# Patient Record
Sex: FEMALE | Race: WHITE | NOT HISPANIC OR LATINO | Employment: UNEMPLOYED | URBAN - METROPOLITAN AREA
[De-identification: names, ages, dates, MRNs, and addresses within clinical notes are randomized per-mention and may not be internally consistent; named-entity substitution may affect disease eponyms.]

---
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
## 2023-01-01 ENCOUNTER — HOSPITAL ENCOUNTER (EMERGENCY)
Facility: HOSPITAL | Age: 18
Discharge: PSYCHIATRIC OR SUBSTANCE USE FACILITY | End: 2023-01-01
Attending: EMERGENCY MEDICINE
Payer: MEDICAID

## 2023-01-01 VITALS
TEMPERATURE: 98 F | HEIGHT: 61 IN | OXYGEN SATURATION: 96 % | SYSTOLIC BLOOD PRESSURE: 106 MMHG | HEART RATE: 98 BPM | RESPIRATION RATE: 18 BRPM | WEIGHT: 98 LBS | BODY MASS INDEX: 18.5 KG/M2 | DIASTOLIC BLOOD PRESSURE: 67 MMHG

## 2023-01-01 DIAGNOSIS — R45.851 DEPRESSION WITH SUICIDAL IDEATION: Primary | ICD-10-CM

## 2023-01-01 DIAGNOSIS — F32.A DEPRESSION WITH SUICIDAL IDEATION: Primary | ICD-10-CM

## 2023-01-01 LAB
ALBUMIN SERPL-MCNC: 4.2 G/DL (ref 3.5–5)
ALBUMIN/GLOB SERPL: 1.7 RATIO (ref 1.1–2)
ALP SERPL-CCNC: 65 UNIT/L (ref 40–150)
ALT SERPL-CCNC: 9 UNIT/L (ref 0–55)
AMPHET UR QL SCN: NEGATIVE
APAP SERPL-MCNC: <17.4 UG/ML (ref 17.4–30)
APPEARANCE UR: CLEAR
AST SERPL-CCNC: 15 UNIT/L (ref 5–34)
B-HCG SERPL QL: NEGATIVE
BACTERIA #/AREA URNS AUTO: NORMAL /HPF
BARBITURATE SCN PRESENT UR: NEGATIVE
BASOPHILS # BLD AUTO: 0.05 X10(3)/MCL (ref 0–0.2)
BASOPHILS NFR BLD AUTO: 1 %
BENZODIAZ UR QL SCN: NEGATIVE
BILIRUB UR QL STRIP.AUTO: NEGATIVE MG/DL
BILIRUBIN DIRECT+TOT PNL SERPL-MCNC: 0.3 MG/DL
BUN SERPL-MCNC: 6.9 MG/DL (ref 7–18.7)
CALCIUM SERPL-MCNC: 9.1 MG/DL (ref 8.4–10.2)
CANNABINOIDS UR QL SCN: NEGATIVE
CHLORIDE SERPL-SCNC: 112 MMOL/L (ref 98–107)
CO2 SERPL-SCNC: 24 MMOL/L (ref 22–29)
COCAINE UR QL SCN: NEGATIVE
COLOR UR AUTO: YELLOW
CREAT SERPL-MCNC: 0.7 MG/DL (ref 0.55–1.02)
EOSINOPHIL # BLD AUTO: 0.02 X10(3)/MCL (ref 0–0.9)
EOSINOPHIL NFR BLD AUTO: 0.4 %
ERYTHROCYTE [DISTWIDTH] IN BLOOD BY AUTOMATED COUNT: 12.4 % (ref 11–14.5)
ETHANOL SERPL-MCNC: 123 MG/DL
FENTANYL UR QL SCN: NEGATIVE
GFR SERPLBLD CREATININE-BSD FMLA CKD-EPI: >60 MLS/MIN/1.73/M2
GLOBULIN SER-MCNC: 2.5 GM/DL (ref 2.4–3.5)
GLUCOSE SERPL-MCNC: 88 MG/DL (ref 74–100)
GLUCOSE UR QL STRIP.AUTO: NEGATIVE MG/DL
HCT VFR BLD AUTO: 37.2 % (ref 37–47)
HGB BLD-MCNC: 12.3 GM/DL (ref 12–16)
IMM GRANULOCYTES # BLD AUTO: 0.02 X10(3)/MCL (ref 0–0.04)
IMM GRANULOCYTES NFR BLD AUTO: 0.4 %
KETONES UR QL STRIP.AUTO: NEGATIVE MG/DL
LEUKOCYTE ESTERASE UR QL STRIP.AUTO: NEGATIVE UNIT/L
LYMPHOCYTES # BLD AUTO: 1.89 X10(3)/MCL (ref 0.6–4.6)
LYMPHOCYTES NFR BLD AUTO: 37.3 %
MCH RBC QN AUTO: 29.2 PG
MCHC RBC AUTO-ENTMCNC: 33.1 MG/DL (ref 33–36)
MCV RBC AUTO: 88.4 FL (ref 80–94)
MDMA UR QL SCN: NEGATIVE
MONOCYTES # BLD AUTO: 0.41 X10(3)/MCL (ref 0.1–1.3)
MONOCYTES NFR BLD AUTO: 8.1 %
NEUTROPHILS # BLD AUTO: 2.68 X10(3)/MCL (ref 2.1–9.2)
NEUTROPHILS NFR BLD AUTO: 52.8 %
NITRITE UR QL STRIP.AUTO: NEGATIVE
NRBC BLD AUTO-RTO: 0 % (ref 0–1)
OPIATES UR QL SCN: NEGATIVE
PCP UR QL: NEGATIVE
PH UR STRIP.AUTO: 6 [PH]
PH UR: 6 [PH] (ref 3–11)
PLATELET # BLD AUTO: 211 X10(3)/MCL (ref 140–371)
PMV BLD AUTO: 11.2 FL (ref 9.4–12.4)
POTASSIUM SERPL-SCNC: 4.5 MMOL/L (ref 3.5–5.1)
PROT SERPL-MCNC: 6.7 GM/DL (ref 6.4–8.3)
PROT UR QL STRIP.AUTO: NEGATIVE MG/DL
RBC # BLD AUTO: 4.21 X10(6)/MCL (ref 4.2–5.4)
RBC #/AREA URNS AUTO: <5 /HPF
RBC UR QL AUTO: NEGATIVE UNIT/L
SODIUM SERPL-SCNC: 145 MMOL/L (ref 136–145)
SP GR UR STRIP.AUTO: 1.01 (ref 1–1.03)
SQUAMOUS #/AREA URNS AUTO: <5 /HPF
TSH SERPL-ACNC: 1.26 UIU/ML (ref 0.35–4.94)
UROBILINOGEN UR STRIP-ACNC: 0.2 MG/DL
WBC # SPEC AUTO: 5.1 X10(3)/MCL (ref 4.5–11.5)
WBC #/AREA URNS AUTO: <5 /HPF

## 2023-01-01 PROCEDURE — 99285 EMERGENCY DEPT VISIT HI MDM: CPT

## 2023-01-01 PROCEDURE — 84443 ASSAY THYROID STIM HORMONE: CPT | Performed by: EMERGENCY MEDICINE

## 2023-01-01 PROCEDURE — 80053 COMPREHEN METABOLIC PANEL: CPT | Performed by: EMERGENCY MEDICINE

## 2023-01-01 PROCEDURE — 81001 URINALYSIS AUTO W/SCOPE: CPT | Mod: 59 | Performed by: EMERGENCY MEDICINE

## 2023-01-01 PROCEDURE — 82077 ASSAY SPEC XCP UR&BREATH IA: CPT | Performed by: EMERGENCY MEDICINE

## 2023-01-01 PROCEDURE — 80143 DRUG ASSAY ACETAMINOPHEN: CPT | Performed by: EMERGENCY MEDICINE

## 2023-01-01 PROCEDURE — 80307 DRUG TEST PRSMV CHEM ANLYZR: CPT | Performed by: EMERGENCY MEDICINE

## 2023-01-01 PROCEDURE — 85025 COMPLETE CBC W/AUTO DIFF WBC: CPT | Performed by: EMERGENCY MEDICINE

## 2023-01-01 PROCEDURE — 81025 URINE PREGNANCY TEST: CPT | Performed by: EMERGENCY MEDICINE

## 2023-01-01 NOTE — ED PROVIDER NOTES
"Encounter Date: 1/1/2023       History     Chief Complaint   Patient presents with    Suicidal     Presents with SI.      17-year-old female presenting with suicidal ideation today.  Patient came here alone after a night of drinking in arguing with her boyfriend.  She states that she thinks she was just mad that was trying to get attention.   Patient says she does not deserve a hospital bed here because there are "actual people dying" Denies any plan of suicide.  Has had suicide attempts in the past.  States she is taking her Latuda as prescribed    The history is provided by the patient.   Mental Health Problem  The primary symptoms include depressed mood and suicidal ideas. The primary symptoms do not include self-injury. The current episode started today. This is a recurrent problem.   The onset of the illness is precipitated by alcohol abuse and emotional stress. The degree of incapacity that she is experiencing as a consequence of her illness is moderate. Additional symptoms of the illness include poor judgment. Additional symptoms of the illness do not include abdominal pain. She admits to suicidal ideas. She has not already injured self. She does not contemplate injuring another person. She has not already  injured another person. Risk factors that are present for mental illness include a history of mental illness.   Review of patient's allergies indicates:  Not on File  Past Medical History:   Diagnosis Date    Anxiety disorder, unspecified     Depression     Scoliosis      No past surgical history on file.  No family history on file.  Social History     Substance Use Topics    Alcohol use: Yes     Review of Systems   Constitutional:  Negative for fever.   HENT:  Negative for congestion.    Gastrointestinal:  Negative for abdominal pain, nausea and vomiting.   Psychiatric/Behavioral:  Positive for suicidal ideas. Negative for self-injury.      Physical Exam     Initial Vitals [01/01/23 0532]   BP Pulse Resp " Temp SpO2   115/62 98 16 98.2 °F (36.8 °C) 98 %      MAP       --         Physical Exam    Nursing note and vitals reviewed.  Constitutional: She appears well-developed and well-nourished. She is not diaphoretic. She does not appear ill. No distress.   HENT:   Head: Normocephalic and atraumatic.   Right Ear: External ear normal.   Left Ear: External ear normal.   Mouth/Throat: Oropharynx is clear and moist.   Eyes: Conjunctivae and EOM are normal. Pupils are equal, round, and reactive to light.   Neck: Neck supple. No tracheal deviation present.   Cardiovascular:  Normal rate, regular rhythm, normal heart sounds and intact distal pulses.           No murmur heard.  Pulmonary/Chest: Breath sounds normal. No respiratory distress.   Abdominal: She exhibits no distension.   No right CVA tenderness.  No left CVA tenderness.   Musculoskeletal:         General: Normal range of motion.      Cervical back: Neck supple.     Neurological: She is alert and oriented to person, place, and time. She has normal strength. GCS score is 15. GCS eye subscore is 4. GCS verbal subscore is 5. GCS motor subscore is 6.   Skin: Skin is warm and dry. Capillary refill takes less than 2 seconds. No pallor.   Psychiatric: Her speech is normal. She is withdrawn. She expresses impulsivity and inappropriate judgment. She exhibits a depressed mood. She expresses no suicidal plans.       ED Course   Procedures  Labs Reviewed   COMPREHENSIVE METABOLIC PANEL - Abnormal; Notable for the following components:       Result Value    Chloride 112 (*)     Blood Urea Nitrogen 6.9 (*)     All other components within normal limits   ALCOHOL,MEDICAL (ETHANOL) - Abnormal; Notable for the following components:    Ethanol Level 123.0 (*)     All other components within normal limits   ACETAMINOPHEN LEVEL - Abnormal; Notable for the following components:    Acetaminophen Level <17.4 (*)     All other components within normal limits   TSH - Normal   URINALYSIS,  REFLEX TO URINE CULTURE - Normal   DRUG SCREEN, URINE (BEAKER) - Normal    Narrative:     Cut off concentrations:    Amphetamines - 1000 ng/ml  Barbiturates - 200 ng/ml  Benzodiazepine - 200 ng/ml  Cannabinoids (THC) - 50 ng/ml  Cocaine - 300 ng/ml  Fentanyl - 1.0 ng/ml  MDMA - 500 ng/ml  Opiates - 300 ng/ml   Phencyclidine (PCP) - 25 ng/ml    Specimen submitted for drug analysis and tested for pH and specific gravity in order to evaluate sample integrity. Suspect tampering if specific gravity is <1.003 and/or pH is not within the range of 4.5 - 8.0  False negatives may result form substances such as bleach added to urine.  False positives may result for the presence of a substance with similar chemical structure to the drug or its metabolite.    This test provides only a PRELIMINARY analytical test result. A more specific alternate chemical method must be used in order to obtain a confirmed analytical result. Gas chromatography/mass spectrometry (GC/MS) is the preferred confirmatory method. Other chemical confirmation methods are available. Clinical consideration and professional judgement should be applied to any drug of abuse test result, particularly when preliminary positive results are used.    Positive results will be confirmed only at the physicians request. Unconfirmed screening results are to be used only for medical purposes (treatment).        PREGNANCY TEST, URINE RAPID - Normal   URINALYSIS, MICROSCOPIC - Normal   CBC W/ AUTO DIFFERENTIAL    Narrative:     The following orders were created for panel order CBC auto differential.  Procedure                               Abnormality         Status                     ---------                               -----------         ------                     CBC with Differential[[id number]]                            Final result                 Please view results for these tests on the individual orders.   CBC WITH DIFFERENTIAL   SARS CORONAVIRUS 2 ANTIGEN  "POCT, MANUAL READ          Imaging Results    None          Medications - No data to display  Medical Decision Making:   Initial Assessment:   Depressed, withdrawn, admits to suicidal thoughts but no plan. Patient expresses suicidal thoughts though states she drank too much and was mad at her boyfriend. She says she thinks she was just trying to get attention and she does not deserve a hospital bed here because there are "actual people dying"   Differential Diagnosis:   suicidal ideations, homicidal ideations, auditory or visual hallucinations, drug/etoh intoxication, bipolar disorder, schizophrenia, schizoaffective, delusional disorder, major depressive disorder, PTSD, substance induced mood disorder, violent behavior, suicidal attempt, non-psychiatric medical illness, malingering      Clinical Tests:   Lab Tests: Ordered and Reviewed  ED Management:  Patient is currently a danger to herself, placed under PEC 0559  Basic psych labs obtained and normal  Medically clear for psych facility            ED Course as of 01/01/23 1021   Sun Jan 01, 2023   1019 Alcohol, Serum(!): 123.0  Reviewed patient lab work-her alcohol level is slightly elevated at 123 with a remainder of her labs unremarkable.  She is medically clear for transfer to a psychiatric facility [KM]      ED Course User Index  [KM] Yari Kruse MD       Medically cleared for psychiatry placement: 1/1/2023 10:20 AM         Clinical Impression:   Final diagnoses:  [F32.A, R45.851] Depression with suicidal ideation (Primary)        ED Disposition Condition    Transfer to Psych Facility Stable          ED Prescriptions    None       Follow-up Information    None          Yari Kruse MD  01/01/23 1021    "

## 2023-01-03 NOTE — ED NOTES
PT MEDICATIONS FOUND IN [facility]. Counted and placed in security sealed bag #[id number] and given to Security

## 2023-06-20 DIAGNOSIS — O30.049 TWIN PREGNANCY, TWINS DICHORIONIC AND DIAMNIOTIC: Primary | ICD-10-CM

## 2023-08-15 ENCOUNTER — OFFICE VISIT (OUTPATIENT)
Dept: MATERNAL FETAL MEDICINE | Facility: CLINIC | Age: 18
End: 2023-08-15
Payer: MEDICAID

## 2023-08-15 ENCOUNTER — PROCEDURE VISIT (OUTPATIENT)
Dept: MATERNAL FETAL MEDICINE | Facility: CLINIC | Age: 18
End: 2023-08-15
Payer: MEDICAID

## 2023-08-15 VITALS
WEIGHT: 111 LBS | DIASTOLIC BLOOD PRESSURE: 63 MMHG | BODY MASS INDEX: 20.96 KG/M2 | SYSTOLIC BLOOD PRESSURE: 99 MMHG | HEIGHT: 61 IN | HEART RATE: 75 BPM

## 2023-08-15 DIAGNOSIS — Q78.0 OSTEOGENESIS IMPERFECTA TYPE I: ICD-10-CM

## 2023-08-15 DIAGNOSIS — O30.042 DICHORIONIC DIAMNIOTIC TWIN PREGNANCY IN SECOND TRIMESTER: ICD-10-CM

## 2023-08-15 DIAGNOSIS — F31.9 BIPOLAR DISEASE IN PREGNANCY, SECOND TRIMESTER: ICD-10-CM

## 2023-08-15 DIAGNOSIS — O30.049 TWIN PREGNANCY, TWINS DICHORIONIC AND DIAMNIOTIC: ICD-10-CM

## 2023-08-15 DIAGNOSIS — O09.90 AT HIGH RISK FOR COMPLICATIONS OF INTRAUTERINE PREGNANCY (IUP): ICD-10-CM

## 2023-08-15 DIAGNOSIS — O99.342 BIPOLAR DISEASE IN PREGNANCY, SECOND TRIMESTER: ICD-10-CM

## 2023-08-15 PROCEDURE — 3008F BODY MASS INDEX DOCD: CPT | Mod: CPTII,S$GLB,, | Performed by: OBSTETRICS & GYNECOLOGY

## 2023-08-15 PROCEDURE — 99204 OFFICE O/P NEW MOD 45 MIN: CPT | Mod: TH,25,S$GLB, | Performed by: OBSTETRICS & GYNECOLOGY

## 2023-08-15 PROCEDURE — 76811 PR US, OB FETAL EVAL & EXAM, TRANSABDOM,FIRST GESTATION: ICD-10-PCS | Mod: S$GLB,,, | Performed by: OBSTETRICS & GYNECOLOGY

## 2023-08-15 PROCEDURE — 99204 PR OFFICE/OUTPT VISIT, NEW, LEVL IV, 45-59 MIN: ICD-10-PCS | Mod: TH,25,S$GLB, | Performed by: OBSTETRICS & GYNECOLOGY

## 2023-08-15 PROCEDURE — 76811 OB US DETAILED SNGL FETUS: CPT | Mod: S$GLB,,, | Performed by: OBSTETRICS & GYNECOLOGY

## 2023-08-15 PROCEDURE — 76812 OB US DETAILED ADDL FETUS: ICD-10-PCS | Mod: S$GLB,,, | Performed by: OBSTETRICS & GYNECOLOGY

## 2023-08-15 PROCEDURE — 76812 OB US DETAILED ADDL FETUS: CPT | Mod: S$GLB,,, | Performed by: OBSTETRICS & GYNECOLOGY

## 2023-08-15 PROCEDURE — 3074F SYST BP LT 130 MM HG: CPT | Mod: CPTII,S$GLB,, | Performed by: OBSTETRICS & GYNECOLOGY

## 2023-08-15 PROCEDURE — 76817 TRANSVAGINAL US OBSTETRIC: CPT | Mod: S$GLB,,, | Performed by: OBSTETRICS & GYNECOLOGY

## 2023-08-15 PROCEDURE — 3074F PR MOST RECENT SYSTOLIC BLOOD PRESSURE < 130 MM HG: ICD-10-PCS | Mod: CPTII,S$GLB,, | Performed by: OBSTETRICS & GYNECOLOGY

## 2023-08-15 PROCEDURE — 3078F DIAST BP <80 MM HG: CPT | Mod: CPTII,S$GLB,, | Performed by: OBSTETRICS & GYNECOLOGY

## 2023-08-15 PROCEDURE — 3008F PR BODY MASS INDEX (BMI) DOCUMENTED: ICD-10-PCS | Mod: CPTII,S$GLB,, | Performed by: OBSTETRICS & GYNECOLOGY

## 2023-08-15 PROCEDURE — 1159F MED LIST DOCD IN RCRD: CPT | Mod: CPTII,S$GLB,, | Performed by: OBSTETRICS & GYNECOLOGY

## 2023-08-15 PROCEDURE — 76817 PR US, OB, TRANSVAG APPROACH: ICD-10-PCS | Mod: S$GLB,,, | Performed by: OBSTETRICS & GYNECOLOGY

## 2023-08-15 PROCEDURE — 3078F PR MOST RECENT DIASTOLIC BLOOD PRESSURE < 80 MM HG: ICD-10-PCS | Mod: CPTII,S$GLB,, | Performed by: OBSTETRICS & GYNECOLOGY

## 2023-08-15 PROCEDURE — 1159F PR MEDICATION LIST DOCUMENTED IN MEDICAL RECORD: ICD-10-PCS | Mod: CPTII,S$GLB,, | Performed by: OBSTETRICS & GYNECOLOGY

## 2023-08-15 RX ORDER — LURASIDONE HYDROCHLORIDE 20 MG/1
20 TABLET, FILM COATED ORAL
COMMUNITY
Start: 2023-04-19 | End: 2023-11-07 | Stop reason: SDUPTHER

## 2023-08-15 RX ORDER — ASPIRIN 81 MG/1
81 TABLET ORAL DAILY
Refills: 0 | COMMUNITY
Start: 2023-08-15 | End: 2023-09-26 | Stop reason: SDUPTHER

## 2023-08-15 NOTE — ASSESSMENT & PLAN NOTE
Counseled on twin gestation pregnancies. She was advised of the rate of twins at 3.2% appears to have stabilized after increasing over the past few decades. 75% of twins are dizygotic and 25% monozygotic. Among the 25% monozygotic twins, 25% are dichorionic and 75% monochorionic (when embryo splits within 3 days of fertilization).    The higher risk of anomalies with twin gestation and 8% risks in spontaneous pregnancy loss was discussed. The reassurance obtained by the normal ultrasound, the limitations of ultrasound in checking anomalies and need for  evaluation was addressed. There is also higher risk of aneuploidy with dizygotic twins, which is twice the risk based on background maternal age or egg-donor age.    Additionally, the increased risk of  labor and delivery was discussed.  There is no prevention recommended with no history of previous  delivery.      There is data to show that vaginal progesterone, is helpful for decreasing risk of  delivery with olson, but data is not conclusive for twins and is still considered investigational at this time, . [place] cervical surveillance is not known - will plan to check cervix around 16 weeks. If shortening noted at 16 week scan will need closer surveillance and may consider vaginal progesterone, though investigational as above. She was advised to come in at anytime if there is increased cramping, vaginal discharge, or bleeding.     Additionally, prophylactic cerclage, in an asymptomatic patient, even in the face of a short cervix is not recommended as it may increase risk of  delivery. Neither Bed rest nor tocolytic therapy are effective in prolonging pregnancy and are not recommended. Diagnostic tests that may predict  labor and delivery like fetal fibronectin test and transvaginal ultrasound are not recommended in the asymptomatic patients as they are not known to prolong pregnancy nor improve outcomes.    Use aspirin  as discussed.    Additionally, there is higher risk of fetal growth restriction, hypertensive disorders, gestational diabetes mellitus, anemia, cerebral palsy postpartum hemorrhage and slight increase in maternal mortality.  I discussed with her of the need for  ll do serial follow up ultrasounds every 4 weeks till the end of pregnancy, after next ultrasound in 6 weeks where we will try to complete anatomy scan. If evidence of fetal growth restriction or other complications occur, then closer fetal surveillance will be needed.  If no additional risk factors are present, then optimal time of delivery is at 38 weeks with dichorionic diamniotic twins.

## 2023-08-15 NOTE — ASSESSMENT & PLAN NOTE
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  With her mild disease most likely pregnancy will be tolerated well with no additional treatment.  She is not interested in prenatal diagnosis with the amniocentesis offered and declined.

## 2023-08-15 NOTE — ASSESSMENT & PLAN NOTE
With her risk factors for preeclampsia including multifetal pregnancy , nulliparity and low socioeconomic status, discussed recommendations for low dose aspirin use to decrease risks for adverse outcomes, including preeclampsia, low birth rate and  delivery. Low-dose aspirin reduced the risk for preeclampsia by 24% in clinical trials and reduced the risk for  birth by 14% and FGR by 20%.  After discussing risks/benefits of its use, it was agreed to start ASA 81 mg daily today and continue till delivery. Also, recommend using in all future pregnancies.

## 2023-08-15 NOTE — ASSESSMENT & PLAN NOTE
Discussed risks with depression and risks/benefits of antidepressant medication use in pregnancy. Although earlier limited data suggested association of paroxetine (Paxil) with right ventricular outflow tract obstruction and sertraline (Zoloft) with ventricular septal heart defects, a recent (2014) large population based study showed no substantial increase in the risk of cardiac malformations attributable to antidepressant use in 1st trimester. The absolute risk of other reported risks in some studies is very small: omphalocele of 1 in 5,000 births, craniosynostosis 1 in 1,800 births, and anencephaly of 1 in 1,000 births. I discussed with her that the SSRI medication used in pregnancy is associated with potential small risk of pulmonary hypertension when used after 20 weeks gestation (in 6-12 per thousand exposed women). However, discontinuing medication is associated with a higher risk with recurrence of symptoms . Relapse rate is 68% if medication is discontinued, vs 25% with continued medication use. Untreated depression may increase the risk of low weight gain, sexually transmitted diseases, alcohol & substance abuse, all of which have maternal and fetal health implications. Factors associated with relapse during pregnancy include a history of more than 5 years of depressive illness and of more than four episodes of relapse.     ACOG recommends that therapy for mental health disorders during pregnancy be individualized. Treatment of anxiety and depression should incorporate the clinical expertise of her mental health clinician, her obstetrician, her primary care provider, and her pediatrician. The risks of untreated depression and the benefits of treatment must be weighed against the risks associated with the use of psychiatric medications during pregnancy.    Depending upon the severity of her symptoms and previous response to discontinued medication, consider weaning down or off medications if possible.  Although discontinuing the medication for a few weeks before delivery  has been suggested, to avoid  withdrawal, the potential risks to mom and lack of proven benefit from this approach, makes continuing medication till delivery a reasonable option. Discussed the association of higher  morbidity with SSRI medication use close to delivery (in 30 % of neonates) including higher rate of admission to intensive care unit, jitteriness, mild respiratory distress, tachypnea of the , weak cry, and poor tone. She was also advised to report any worsening of symptoms or SI/HI tendencies immediately to provider/ER for prompt intervention.  She was advised to continue follow up care with her Mental Health provider.  Agreed to continue taking the Latuda at this time and keep follow-up with psychiatrist      1

## 2023-08-15 NOTE — PROGRESS NOTES
Maternal Fetal Medicine New Consult    Subjective     Patient ID: [id number]    Chief Complaint: MFM Consult w/us  (For di/di twin gestation )      Pregnancy complications include:   Patient Active Problem List   Diagnosis    Dichorionic diamniotic twin pregnancy in second trimester    At high risk for preeclampsia complications of intrauterine pregnancy (IUP)    Osteogenesis imperfecta type I    Bipolar disease in pregnancy, second trimester        HPI 18 y.o.  female  at 18w0d gestation with Estimated Date of Delivery: 24 by early US, not consistent with LMP. She is sent for MFM consultation for twin gestation.  Patient has dichorionic diamniotic twins, that was naturally conceived.  She has history of bipolar disorder for a few years has been on Latuda for about 3-4 years and is well controlled.  Patient is followed by psychiatry service, Terrie Chambers.  Patient has history of scoliosis has been stable and no follow-up or treatment is needed.  Patient has history of osteogenesis imperfecta type 1.  Patient reported that she would a fracture when she was young but does not remember details probably in her leg.  She denies any personal or family history of aneuploidy or anomalies. She denies any exposure to high fevers, viral rashes, illicit drugs or alcohol in this pregnancy.  She denies any leaking fluid, vaginal bleeding, contractions, decreased fetal movement. Denies headaches, visual disturbances, or epigastric pain.  Patient is accompanied by her sister Meli.    Past Medical History:   Diagnosis Date    Anxiety disorder, unspecified     Depression     Scoliosis        History reviewed. No pertinent surgical history.    Family History   Problem Relation Age of Onset    Hypertension Father        Social History     Socioeconomic History    Marital status: Single   Tobacco Use    Smoking status: Never     Passive exposure: Never    Smokeless tobacco: Never   Substance and Sexual Activity    Alcohol use:  Not Currently    Drug use: Never    Sexual activity: Yes     Partners: Male       Current Outpatient Medications   Medication Sig Dispense Refill    LATUDA 20 mg Tab tablet Take 20 mg by mouth.      PNV no.153/FA/om3/dha/epa/fish (PRENATAL GUMMIES ORAL) Take by mouth.      aspirin (ECOTRIN) 81 MG EC tablet Take 1 tablet (81 mg total) by mouth once daily.  0     No current facility-administered medications for this visit.       Review of patient's allergies indicates:  No Known Allergies    Medications:  Current Outpatient Medications   Medication Instructions    aspirin (ECOTRIN) 81 mg, Oral, Daily    LATUDA 20 mg, Oral    PNV no.153/FA/om3/dha/epa/fish (PRENATAL GUMMIES ORAL) Oral         Review of Systems   12 point review of systems conducted, negative except as stated in the history of present illness. See HPI for details.    Vitals:    08/15/23 1001   BP: 99/63   Pulse: 75       Physical Exam      ASSESSMENT/PLAN:     18 y.o.  female with IUP at 18w0d    At high risk for preeclampsia complications of intrauterine pregnancy (IUP)  With her risk factors for preeclampsia including multifetal pregnancy , nulliparity and low socioeconomic status, discussed recommendations for low dose aspirin use to decrease risks for adverse outcomes, including preeclampsia, low birth rate and  delivery. Low-dose aspirin reduced the risk for preeclampsia by 24% in clinical trials and reduced the risk for  birth by 14% and FGR by 20%.  After discussing risks/benefits of its use, it was agreed to start ASA 81 mg daily today and continue till delivery. Also, recommend using in all future pregnancies.      Dichorionic diamniotic twin pregnancy in second trimester  Counseled on twin gestation pregnancies. She was advised of the rate of twins at 3.2% appears to have stabilized after increasing over the past few decades. 75% of twins are dizygotic and 25% monozygotic. Among the 25% monozygotic twins, 25% are dichorionic  and 75% monochorionic (when embryo splits within 3 days of fertilization).    The higher risk of anomalies with twin gestation and 8% risks in spontaneous pregnancy loss was discussed. The reassurance obtained by the normal ultrasound, the limitations of ultrasound in checking anomalies and need for  evaluation was addressed. There is also higher risk of aneuploidy with dizygotic twins, which is twice the risk based on background maternal age or egg-donor age.    Additionally, the increased risk of  labor and delivery was discussed.  There is no prevention recommended with no history of previous  delivery.      There is data to show that vaginal progesterone, is helpful for decreasing risk of  delivery with olson, but data is not conclusive for twins and is still considered investigational at this time, . [place] cervical surveillance is not known - will plan to check cervix around 16 weeks. If shortening noted at 16 week scan will need closer surveillance and may consider vaginal progesterone, though investigational as above. She was advised to come in at anytime if there is increased cramping, vaginal discharge, or bleeding.     Additionally, prophylactic cerclage, in an asymptomatic patient, even in the face of a short cervix is not recommended as it may increase risk of  delivery. Neither Bed rest nor tocolytic therapy are effective in prolonging pregnancy and are not recommended. Diagnostic tests that may predict  labor and delivery like fetal fibronectin test and transvaginal ultrasound are not recommended in the asymptomatic patients as they are not known to prolong pregnancy nor improve outcomes.    Use aspirin as discussed.    Additionally, there is higher risk of fetal growth restriction, hypertensive disorders, gestational diabetes mellitus, anemia, cerebral palsy postpartum hemorrhage and slight increase in maternal mortality.  I discussed with her of the need  for  ll do serial follow up ultrasounds every 4 weeks till the end of pregnancy, after next ultrasound in 6 weeks where we will try to complete anatomy scan. If evidence of fetal growth restriction or other complications occur, then closer fetal surveillance will be needed.  If no additional risk factors are present, then optimal time of delivery is at 38 weeks with dichorionic diamniotic twins.      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  With her mild disease most likely pregnancy will be tolerated well with no additional treatment.  She is not interested in prenatal diagnosis with the amniocentesis offered and declined.    Bipolar disease in pregnancy, second trimester  Discussed risks with depression and risks/benefits of antidepressant medication use in pregnancy. Although earlier limited data suggested association of paroxetine (Paxil) with right ventricular outflow tract obstruction and sertraline (Zoloft) with ventricular septal heart defects, a recent (2014) large population based study showed no substantial increase in the risk of cardiac malformations attributable to antidepressant use in 1st trimester. The absolute risk of other reported risks in some studies is very small: omphalocele of 1 in 5,000 births, craniosynostosis 1 in 1,800 births, and anencephaly of 1 in 1,000 births. I discussed with her that the SSRI medication used in pregnancy is associated with potential small risk of pulmonary hypertension when used after 20 weeks gestation (in 6-12 per thousand exposed women). However, discontinuing medication is associated with a higher risk with recurrence of symptoms . Relapse rate is 68% if medication is discontinued, vs 25% with continued medication use. Untreated depression may increase the risk of low weight gain, sexually transmitted diseases, alcohol & substance abuse, all of which have maternal  and fetal health implications. Factors associated with relapse during pregnancy include a history of more than 5 years of depressive illness and of more than four episodes of relapse.     ACOG recommends that therapy for mental health disorders during pregnancy be individualized. Treatment of anxiety and depression should incorporate the clinical expertise of her mental health clinician, her obstetrician, her primary care provider, and her pediatrician. The risks of untreated depression and the benefits of treatment must be weighed against the risks associated with the use of psychiatric medications during pregnancy.    Depending upon the severity of her symptoms and previous response to discontinued medication, consider weaning down or off medications if possible. Although discontinuing the medication for a few weeks before delivery  has been suggested, to avoid  withdrawal, the potential risks to mom and lack of proven benefit from this approach, makes continuing medication till delivery a reasonable option. Discussed the association of higher  morbidity with SSRI medication use close to delivery (in 30 % of neonates) including higher rate of admission to intensive care unit, jitteriness, mild respiratory distress, tachypnea of the , weak cry, and poor tone. She was also advised to report any worsening of symptoms or SI/HI tendencies immediately to provider/ER for prompt intervention.  She was advised to continue follow up care with her Mental Health provider.  Agreed to continue taking the Latuda at this time and keep follow-up with psychiatrist         Follow up in about 6 weeks (around 2023) for Repeat  ultrasound, MFM follow-up, Twins, room1,2.     No future appointments.       Components of this note were documented using voice recognition systems; and are subject to errors not corrected at proof reading.  Please contact the author for any clarifications.

## 2023-09-06 DIAGNOSIS — O30.042 DICHORIONIC DIAMNIOTIC TWIN PREGNANCY IN SECOND TRIMESTER: Primary | ICD-10-CM

## 2023-09-06 DIAGNOSIS — O09.90 AT HIGH RISK FOR COMPLICATIONS OF INTRAUTERINE PREGNANCY (IUP): ICD-10-CM

## 2023-09-26 ENCOUNTER — PROCEDURE VISIT (OUTPATIENT)
Dept: MATERNAL FETAL MEDICINE | Facility: CLINIC | Age: 18
End: 2023-09-26
Payer: MEDICAID

## 2023-09-26 ENCOUNTER — OFFICE VISIT (OUTPATIENT)
Dept: MATERNAL FETAL MEDICINE | Facility: CLINIC | Age: 18
End: 2023-09-26
Payer: MEDICAID

## 2023-09-26 VITALS
HEART RATE: 103 BPM | HEIGHT: 61 IN | WEIGHT: 130 LBS | BODY MASS INDEX: 24.55 KG/M2 | SYSTOLIC BLOOD PRESSURE: 125 MMHG | DIASTOLIC BLOOD PRESSURE: 81 MMHG

## 2023-09-26 DIAGNOSIS — O30.042 DICHORIONIC DIAMNIOTIC TWIN PREGNANCY IN SECOND TRIMESTER: ICD-10-CM

## 2023-09-26 DIAGNOSIS — O09.90 AT HIGH RISK FOR COMPLICATIONS OF INTRAUTERINE PREGNANCY (IUP): ICD-10-CM

## 2023-09-26 DIAGNOSIS — F31.9 BIPOLAR DISEASE IN PREGNANCY, SECOND TRIMESTER: ICD-10-CM

## 2023-09-26 DIAGNOSIS — O30.042 DICHORIONIC DIAMNIOTIC TWIN PREGNANCY IN SECOND TRIMESTER: Primary | ICD-10-CM

## 2023-09-26 DIAGNOSIS — O99.342 BIPOLAR DISEASE IN PREGNANCY, SECOND TRIMESTER: ICD-10-CM

## 2023-09-26 DIAGNOSIS — Q78.0 OSTEOGENESIS IMPERFECTA TYPE I: ICD-10-CM

## 2023-09-26 PROCEDURE — 3008F BODY MASS INDEX DOCD: CPT | Mod: CPTII,S$GLB,, | Performed by: OBSTETRICS & GYNECOLOGY

## 2023-09-26 PROCEDURE — 3079F PR MOST RECENT DIASTOLIC BLOOD PRESSURE 80-89 MM HG: ICD-10-PCS | Mod: CPTII,S$GLB,, | Performed by: OBSTETRICS & GYNECOLOGY

## 2023-09-26 PROCEDURE — 3074F SYST BP LT 130 MM HG: CPT | Mod: CPTII,S$GLB,, | Performed by: OBSTETRICS & GYNECOLOGY

## 2023-09-26 PROCEDURE — 3008F PR BODY MASS INDEX (BMI) DOCUMENTED: ICD-10-PCS | Mod: CPTII,S$GLB,, | Performed by: OBSTETRICS & GYNECOLOGY

## 2023-09-26 PROCEDURE — 99213 PR OFFICE/OUTPT VISIT, EST, LEVL III, 20-29 MIN: ICD-10-PCS | Mod: TH,S$GLB,, | Performed by: OBSTETRICS & GYNECOLOGY

## 2023-09-26 PROCEDURE — 99213 OFFICE O/P EST LOW 20 MIN: CPT | Mod: TH,S$GLB,, | Performed by: OBSTETRICS & GYNECOLOGY

## 2023-09-26 PROCEDURE — 3079F DIAST BP 80-89 MM HG: CPT | Mod: CPTII,S$GLB,, | Performed by: OBSTETRICS & GYNECOLOGY

## 2023-09-26 PROCEDURE — 76816 OB US FOLLOW-UP PER FETUS: CPT | Mod: 59,S$GLB,, | Performed by: OBSTETRICS & GYNECOLOGY

## 2023-09-26 PROCEDURE — 1159F PR MEDICATION LIST DOCUMENTED IN MEDICAL RECORD: ICD-10-PCS | Mod: CPTII,S$GLB,, | Performed by: OBSTETRICS & GYNECOLOGY

## 2023-09-26 PROCEDURE — 3074F PR MOST RECENT SYSTOLIC BLOOD PRESSURE < 130 MM HG: ICD-10-PCS | Mod: CPTII,S$GLB,, | Performed by: OBSTETRICS & GYNECOLOGY

## 2023-09-26 PROCEDURE — 76816 PR  US,PREGNANT UTERUS,F/U,TRANSABD APP: ICD-10-PCS | Mod: 59,S$GLB,, | Performed by: OBSTETRICS & GYNECOLOGY

## 2023-09-26 PROCEDURE — 1159F MED LIST DOCD IN RCRD: CPT | Mod: CPTII,S$GLB,, | Performed by: OBSTETRICS & GYNECOLOGY

## 2023-09-26 RX ORDER — ASPIRIN 81 MG/1
81 TABLET ORAL DAILY
Refills: 0 | COMMUNITY
Start: 2023-09-26 | End: 2024-07-02

## 2023-09-26 NOTE — PROGRESS NOTES
Maternal Fetal Medicine Follow Up Consult    Subjective     Patient ID: [id number]    Chief Complaint: [facility] follow up with US (Di/ Di Twin pregnancy. )      HPI: Rosendo Covarrubias is a 18 y.o. female  at 24w0d gestation with Estimated Date of Delivery: 24  who is here for follow  up consultation by [facility].  Patient has dichorionic diamniotic twins, that was naturally conceived.  She was recommended to take low-dose aspirin for preeclampsia prophylaxis, but she reports is not taking it because she has not picked it up.  She would like us to send to her pharmacy.  She has history of bipolar disorder for a few years has been on Latuda for about 3-4 years and is well controlled.  She reports is not taking the Latuda due to issues with pills causing nausea.  She would like to stay off at this time.  Patient is followed by psychiatry service, Terrie Chambers.  Patient has history of scoliosis has been stable and no follow-up or treatment is needed.  Patient has history of osteogenesis imperfecta type 1.  Patient reported that she would a fracture when she was young but does not remember details probably in her leg.         Interval history since last M visit: None.. She denies any leaking fluid, vaginal bleeding, contractions, decreased fetal movement. Denies headaches, visual disturbances, or epigastric pain    Pregnancy complications include:   Patient Active Problem List   Diagnosis    Dichorionic diamniotic twin pregnancy in second trimester    At high risk for preeclampsia complications of intrauterine pregnancy (IUP)    Osteogenesis imperfecta type I    Bipolar disease in pregnancy, second trimester        No changes to medical, surgical, family, social, or obstetric history.    Medications:  Current Outpatient Medications   Medication Instructions    aspirin (ECOTRIN) 81 mg, Oral, Daily    LATUDA 20 mg, Oral    PNV no.153/FA/om3/dha/epa/fish (PRENATAL GUMMIES ORAL) Oral       Review of Systems   12 point review of  "systems conducted, negative except as stated in the history of present illness. See HPI for details.      Objective     Visit Vitals  /81 (BP Location: Left arm, Patient Position: Sitting, BP Method: Pediatric (Automatic))   Pulse 103   Ht 5' 1" (1.549 m)   Wt 59 kg (130 lb)   LMP 2023 (Approximate)   BMI 24.56 kg/m²        Physical Exam  Vitals and nursing note reviewed.   Constitutional:       Appearance: Normal appearance.   HENT:      Head: Normocephalic and atraumatic.      Nose: Nose normal. No congestion.   Cardiovascular:      Rate and Rhythm: Normal rate.   Pulmonary:      Effort: Pulmonary effort is normal.   Skin:     Findings: No rash.   Neurological:      Mental Status: She is alert and oriented to person, place, and time.   Psychiatric:         Mood and Affect: Mood normal.         Behavior: Behavior normal.         Thought Content: Thought content normal.         Judgment: Judgment normal.           ASSESSMENT/PLAN:     18 y.o.  female with IUP at 24w0d    Dichorionic diamniotic twin pregnancy in second trimester  Fetal sizes are AGA and concordant, FGD 1.7%.   The EFW percentile for twin A is 48%, and the EFW is 666 g on 23.  The EFW percentile for twin B is 51%, and the EFW is 678 g on 23.  AFV is normal.     She is aware of risks associated with twins including higher risk of anomalies, fetal growth restriction, hypertensive disorders, gestational diabetes, anemia, cerebral palsy,  labor/delivery and postpartum hemorrhage with twin gestation.  She is aware of the limitations of ultrasound, including checking anomalies, with need for  evaluation.    Low dose aspirin as discussed.    Due to increased risk factors, recommend continue serial follow-up ultrasounds every 4 weeks till end of pregnancy.  If evidence of fetal growth restriction or other complications occur, then closer fetal surveillance will be needed.    If no additional risk factors are present, " then optimal time of delivery is at 38 weeks with dichorionic diamniotic twins. The importance of doing fetal kick counts three times a day and resting in a lateral recumbent position and reporting immediately at any time there is any decreased fetal movement even if the same day of testing was emphasized. She is to come in at anytime if there is increased cramping, vaginal discharge, vaginal bleeding, or decreased fetal movement.         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  With her mild disease most likely pregnancy will be tolerated well with no additional treatment.  She is not interested in prenatal diagnosis with the amniocentesis offered and declined.      Bipolar disease in pregnancy, second trimester  Reviewed risks with depression and risks/benefits of medication use in pregnancy.    Although discontinuing the medication for a few weeks before delivery  has been suggested, to avoid  withdrawal, the potential risks to mom and lack of proven benefit from this approach, makes continuing medication till delivery a reasonable option.With symptom control, Agreed to stay off of the Latuda at this time and keep follow-up with psychiatrist.    She was also advised to report any worsening of symptoms or SI/HI tendencies immediately to provider/ER for prompt intervention. She was advised to continue follow up care with her Mental Health provider.       At high risk for preeclampsia  With her increased risk for preeclampsia, she agreed to start asa 81 mg daily today and continue until delivery.  Prescription sent to her pharmacy, although advised her that it may not be covered by insurance. Preeclampsia precautions reviewed.     Follow up in about 4 weeks (around 10/24/2023) for MFM follow-up, Repeat ultrasound.     Future Appointments   Date Time Provider Department Center   2023 10:00 AM Enrrique Hardy,  MD [facility]   2023 10:00 AM ROOM 1 AND 2, [facility]        CAYETANO involvement: Patient was evaluated by KARISSA Velasco and Dr. Hardy.  Final assessment and recommendations as stated above were made by Dr. Hardy.      There is normal interval growth of the fetuses.  No signs or symptoms of  labor.   labor instructions.  Urged to start aspirin with still benefit from starting aspirin day although the later the start the less than benefit.  Patient reports some problems with sleep.  Supportive care measures discussed.  Advised her that she could take Benadryl 25 mg p.o. sparingly at hs.  Components of this note were documented using voice recognition systems; and are subject to errors not corrected at proof reading.  Please contact the author for any clarifications.

## 2023-10-10 ENCOUNTER — TELEPHONE (OUTPATIENT)
Dept: MATERNAL FETAL MEDICINE | Facility: CLINIC | Age: 18
End: 2023-10-10
Payer: MEDICAID

## 2023-10-10 DIAGNOSIS — O24.419 GESTATIONAL DIABETES MELLITUS (GDM) IN SECOND TRIMESTER, GESTATIONAL DIABETES METHOD OF CONTROL UNSPECIFIED: Primary | ICD-10-CM

## 2023-10-10 RX ORDER — LANCETS
EACH MISCELLANEOUS
Qty: 120 EACH | Refills: 5 | Status: SHIPPED | OUTPATIENT
Start: 2023-10-10 | End: 2023-10-24 | Stop reason: SDUPTHER

## 2023-10-10 RX ORDER — INSULIN PUMP SYRINGE, 3 ML
EACH MISCELLANEOUS
Qty: 1 EACH | Refills: 0 | Status: SHIPPED | OUTPATIENT
Start: 2023-10-10 | End: 2024-10-09

## 2023-10-11 ENCOUNTER — TELEPHONE (OUTPATIENT)
Dept: MATERNAL FETAL MEDICINE | Facility: CLINIC | Age: 18
End: 2023-10-11
Payer: MEDICAID

## 2023-10-11 NOTE — TELEPHONE ENCOUNTER
Patient called and had some questions about her gestational diabetes and how to check her sugars.  Explained to her how to check her sugars and advised her to check her glucose fasting as well as 1 hour postprandial.  Advised her of the normal ranges for blood glucose.  Advised her to keep her sugar log and call us weekly with her values.  If the values are elevated we will see her back sooner than her next appointment to discuss treatment.  Otherwise she will keep doing sugar checks and her appointment on 10/24/2023.  All of her questions were answered.  She verbalized understanding.  Advised her that if she has any other questions or concerns to feel free to call us.

## 2023-10-23 DIAGNOSIS — O09.90 AT HIGH RISK FOR COMPLICATIONS OF INTRAUTERINE PREGNANCY (IUP): ICD-10-CM

## 2023-10-23 DIAGNOSIS — O30.042 DICHORIONIC DIAMNIOTIC TWIN PREGNANCY IN SECOND TRIMESTER: Primary | ICD-10-CM

## 2023-10-23 DIAGNOSIS — O99.342 BIPOLAR DISEASE IN PREGNANCY, SECOND TRIMESTER: ICD-10-CM

## 2023-10-23 DIAGNOSIS — F31.9 BIPOLAR DISEASE IN PREGNANCY, SECOND TRIMESTER: ICD-10-CM

## 2023-10-24 ENCOUNTER — OFFICE VISIT (OUTPATIENT)
Dept: MATERNAL FETAL MEDICINE | Facility: CLINIC | Age: 18
End: 2023-10-24
Payer: MEDICAID

## 2023-10-24 ENCOUNTER — PROCEDURE VISIT (OUTPATIENT)
Dept: MATERNAL FETAL MEDICINE | Facility: CLINIC | Age: 18
End: 2023-10-24
Payer: MEDICAID

## 2023-10-24 VITALS
HEIGHT: 61 IN | BODY MASS INDEX: 24.73 KG/M2 | SYSTOLIC BLOOD PRESSURE: 131 MMHG | HEART RATE: 116 BPM | WEIGHT: 131 LBS | DIASTOLIC BLOOD PRESSURE: 80 MMHG

## 2023-10-24 DIAGNOSIS — O30.042 DICHORIONIC DIAMNIOTIC TWIN PREGNANCY IN SECOND TRIMESTER: ICD-10-CM

## 2023-10-24 DIAGNOSIS — F31.9 BIPOLAR DISEASE IN PREGNANCY, SECOND TRIMESTER: ICD-10-CM

## 2023-10-24 DIAGNOSIS — O24.419 GESTATIONAL DIABETES MELLITUS (GDM) IN SECOND TRIMESTER, GESTATIONAL DIABETES METHOD OF CONTROL UNSPECIFIED: ICD-10-CM

## 2023-10-24 DIAGNOSIS — O24.414 INSULIN CONTROLLED GESTATIONAL DIABETES MELLITUS (GDM) IN THIRD TRIMESTER: ICD-10-CM

## 2023-10-24 DIAGNOSIS — O30.043 DICHORIONIC DIAMNIOTIC TWIN PREGNANCY IN THIRD TRIMESTER: ICD-10-CM

## 2023-10-24 DIAGNOSIS — O09.90 AT HIGH RISK FOR COMPLICATIONS OF INTRAUTERINE PREGNANCY (IUP): ICD-10-CM

## 2023-10-24 DIAGNOSIS — O99.343 BIPOLAR DISEASE DURING PREGNANCY IN THIRD TRIMESTER: Primary | ICD-10-CM

## 2023-10-24 DIAGNOSIS — O99.342 BIPOLAR DISEASE IN PREGNANCY, SECOND TRIMESTER: ICD-10-CM

## 2023-10-24 DIAGNOSIS — Q78.0 OSTEOGENESIS IMPERFECTA TYPE I: ICD-10-CM

## 2023-10-24 DIAGNOSIS — F31.9 BIPOLAR DISEASE DURING PREGNANCY IN THIRD TRIMESTER: Primary | ICD-10-CM

## 2023-10-24 PROCEDURE — 3075F PR MOST RECENT SYSTOLIC BLOOD PRESS GE 130-139MM HG: ICD-10-PCS | Mod: CPTII,S$GLB,, | Performed by: OBSTETRICS & GYNECOLOGY

## 2023-10-24 PROCEDURE — 1159F MED LIST DOCD IN RCRD: CPT | Mod: CPTII,S$GLB,, | Performed by: OBSTETRICS & GYNECOLOGY

## 2023-10-24 PROCEDURE — 1159F PR MEDICATION LIST DOCUMENTED IN MEDICAL RECORD: ICD-10-PCS | Mod: CPTII,S$GLB,, | Performed by: OBSTETRICS & GYNECOLOGY

## 2023-10-24 PROCEDURE — 3079F PR MOST RECENT DIASTOLIC BLOOD PRESSURE 80-89 MM HG: ICD-10-PCS | Mod: CPTII,S$GLB,, | Performed by: OBSTETRICS & GYNECOLOGY

## 2023-10-24 PROCEDURE — 3075F SYST BP GE 130 - 139MM HG: CPT | Mod: CPTII,S$GLB,, | Performed by: OBSTETRICS & GYNECOLOGY

## 2023-10-24 PROCEDURE — 99214 PR OFFICE/OUTPT VISIT, EST, LEVL IV, 30-39 MIN: ICD-10-PCS | Mod: TH,S$GLB,, | Performed by: OBSTETRICS & GYNECOLOGY

## 2023-10-24 PROCEDURE — 3079F DIAST BP 80-89 MM HG: CPT | Mod: CPTII,S$GLB,, | Performed by: OBSTETRICS & GYNECOLOGY

## 2023-10-24 PROCEDURE — 3008F BODY MASS INDEX DOCD: CPT | Mod: CPTII,S$GLB,, | Performed by: OBSTETRICS & GYNECOLOGY

## 2023-10-24 PROCEDURE — 3008F PR BODY MASS INDEX (BMI) DOCUMENTED: ICD-10-PCS | Mod: CPTII,S$GLB,, | Performed by: OBSTETRICS & GYNECOLOGY

## 2023-10-24 PROCEDURE — 76816 OB US FOLLOW-UP PER FETUS: CPT | Mod: S$GLB,,, | Performed by: OBSTETRICS & GYNECOLOGY

## 2023-10-24 PROCEDURE — 99214 OFFICE O/P EST MOD 30 MIN: CPT | Mod: TH,S$GLB,, | Performed by: OBSTETRICS & GYNECOLOGY

## 2023-10-24 PROCEDURE — 76816 PR  US,PREGNANT UTERUS,F/U,TRANSABD APP: ICD-10-PCS | Mod: S$GLB,,, | Performed by: OBSTETRICS & GYNECOLOGY

## 2023-10-24 RX ORDER — INSULIN HUMAN 100 [IU]/ML
INJECTION, SUSPENSION SUBCUTANEOUS
Qty: 10 ML | Refills: 2 | Status: SHIPPED | OUTPATIENT
Start: 2023-10-24 | End: 2023-12-22 | Stop reason: SDUPTHER

## 2023-10-24 RX ORDER — INSULIN LISPRO 100 [IU]/ML
INJECTION, SOLUTION INTRAVENOUS; SUBCUTANEOUS
Qty: 10 ML | Refills: 2 | Status: SHIPPED | OUTPATIENT
Start: 2023-10-24 | End: 2023-12-18 | Stop reason: SDUPTHER

## 2023-10-24 RX ORDER — LANCETS
EACH MISCELLANEOUS
Qty: 120 EACH | Refills: 3 | Status: SHIPPED | OUTPATIENT
Start: 2023-10-24 | End: 2023-10-30 | Stop reason: SDUPTHER

## 2023-10-24 RX ORDER — NAPROXEN SODIUM 220 MG
TABLET ORAL
Qty: 100 EACH | Refills: 2 | Status: SHIPPED | OUTPATIENT
Start: 2023-10-24

## 2023-10-24 NOTE — PROGRESS NOTES
Maternal Fetal Medicine Follow Up Consult    Subjective     Patient ID: [id number]    Chief Complaint: M follow up with US (Di/ Di Twin pregnancy and GDM.)      HPI: Rosendo Covarrubias is a 18 y.o. female  at 28w0d gestation with Estimated Date of Delivery: 24  who is here for follow  up consultation by M.  Patient has dichorionic diamniotic twins, that was naturally conceived.  She was recommended to take low-dose aspirin for preeclampsia prophylaxis, but she reports is not taking it because she has not picked it up.  She would like us to send to her pharmacy.  She has history of bipolar disorder for a few years has been on Latuda for about 3-4 years and is well controlled.  She reports is not taking the Latuda due to issues with pills causing nausea.  She would like to stay off at this time.  Patient is followed by psychiatry service, Terrie Chambers.  Patient has history of scoliosis has been stable and no follow-up or treatment is needed.  Patient has history of osteogenesis imperfecta type 1.  Patient reported that she would a fracture when she was young but does not remember details probably in her leg.  She was recently diagnosed with gestational diabetes, has been following a diabetic diet and has been checking her sugars.  She brought her sugar log with her and fasting blood sugars are mildly elevated with significant elevations postprandial, mostly post breakfast.         Interval history since last [facility] visit: None.. She denies any leaking fluid, vaginal bleeding, contractions, decreased fetal movement. Denies headaches, visual disturbances, or epigastric pain    Pregnancy complications include:   Patient Active Problem List   Diagnosis    Dichorionic diamniotic twin pregnancy in third trimester    At high risk for preeclampsia complications of intrauterine pregnancy (IUP)    Osteogenesis imperfecta type I    Bipolar disease during pregnancy in third trimester    Insulin controlled gestational diabetes  "mellitus (GDM) in third trimester        No changes to medical, surgical, family, social, or obstetric history.    Medications:  Current Outpatient Medications   Medication Instructions    aspirin (ECOTRIN) 81 mg, Oral, Daily    blood sugar diagnostic Strp Use 1 strip via device 4 times daily    blood-glucose meter kit Use device 4 times daily to check blood sugars    lancets (LANCETS,THIN) Misc Use 1 lancet to skin 4 times daily to check blood sugars    LATUDA 20 mg, Oral    PNV no.153/FA/om3/dha/epa/fish (PRENATAL GUMMIES ORAL) Oral       Review of Systems   12 point review of systems conducted, negative except as stated in the history of present illness. See HPI for details.      Objective     Visit Vitals  /80 (BP Location: Left arm, Patient Position: Sitting, BP Method: Pediatric (Automatic))   Pulse (!) 116   Ht 5' 1" (1.549 m)   Wt 59.4 kg (131 lb)   LMP 2023 (Approximate)   BMI 24.75 kg/m²        Physical Exam  Vitals and nursing note reviewed.   Constitutional:       Appearance: Normal appearance.   HENT:      Head: Normocephalic and atraumatic.      Nose: Nose normal. No congestion.   Pulmonary:      Effort: Pulmonary effort is normal.   Skin:     Findings: No rash.   Neurological:      Mental Status: She is alert and oriented to person, place, and time.   Psychiatric:         Mood and Affect: Mood normal.         Behavior: Behavior normal.         Thought Content: Thought content normal.         Judgment: Judgment normal.           ASSESSMENT/PLAN:     18 y.o.  female with IUP at 28w0d    Dichorionic diamniotic twin pregnancy   Interval growth for both twins has been adequate.   Fetal sizes are AGA and concordant, with FGD 1.8%.   The EFW percentile for twin A is 54%, and the EFW is 1254 g on 10/24/2023.  The EFW percentile for twin B is 55%, and the EFW is 1277 g on 10/24/2023.  AFV is normal.     She is aware of risks associated with twins including higher risk of anomalies, fetal growth " restriction, hypertensive disorders, gestational diabetes, anemia, cerebral palsy,  labor/delivery and postpartum hemorrhage with twin gestation.  She is aware of the limitations of ultrasound, including checking anomalies, with need for  evaluation.    Low dose aspirin as discussed.    Due to increased risk factors, recommend continue serial follow-up ultrasounds every 4 weeks until end of pregnancy.  If evidence of fetal growth restriction or other complications occur, then closer fetal surveillance will be needed.    If no additional risk factors are present, then optimal time of delivery is at 38 weeks with dichorionic diamniotic twins. The importance of doing fetal kick counts three times a day and resting in a lateral recumbent position and reporting immediately at any time there is any decreased fetal movement even if the same day of testing was emphasized. She is to come in at anytime if there is increased cramping, vaginal discharge, vaginal bleeding, or decreased fetal movement.         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  With her mild disease most likely pregnancy will be tolerated well with no additional treatment.  She is not interested in prenatal diagnosis with amniocentesis offered and declined.      Bipolar disease in pregnancy  Reviewed risks with depression and risks/benefits of medication use in pregnancy.    Although discontinuing the medication for a few weeks before delivery  has been suggested, to avoid  withdrawal, the potential risks to mom and lack of proven benefit from this approach, makes continuing medication till delivery a reasonable option.With symptom control, Agreed to stay off of the Latuda at this time and keep follow-up with psychiatrist.    She was also advised to report any worsening of symptoms or SI/HI tendencies immediately to provider/ER for prompt  intervention. She was advised to continue follow up care with her Mental Health provider.       At high risk for preeclampsia  With her increased risk for preeclampsia, she agreed to continue low-dose aspirin daily until delivery.  Preeclampsia precautions reviewed.       Gestational diabetes mellitus  The incidence of diabetes in pregnancy is 6-7%, and about 85% represent GDM. I discussed with her risks associated with diabetes in pregnancy including higher risk for polyhydramnios, fetal macrosomia, lower Apgar scores and  metabolic complications (hypoglycemia, hyperbilirubinemia, hypocalcemia, erythema) and developing preeclampsia. With suboptimal diet control, treatment is recommended in addition to 2200 calorie diabetic diet. It is recommended that she have 30 grams of carbohydrates with breakfast, and 60 grams with lunch and dinner. In addition, she should have three snacks in between meals with 15 grams of carbohydrates and at bedtime with 30 grams of carbohydrates. The importance of avoiding any significant weight gain till the end of the pregnancy was discussed.      I have shared with her the options of treatment including insulin treatment which is the gold standard of care for diabetes in pregnancy versus a recent use of glyburide or metformin as alternatives. Although, glyburide has been used over the past 10 years as primary alternative to insulin, a recent meta-analysis (2015) suggested that metformin should be the alternative to insulin and not glyburide. The conclusion of the study showed a higher birth weight (100 g) associated with glyburide use compared to insulin, two fold higher risk of  hypoglycemia, and more than 2x higher risk of macrosomia associated with glyburide use. This difference is likely to be secondary to hyperinsulinism in fetus secondary to fetal exposure to glyburide.  Metformin, also had lower maternal weight gain, lower birth rate and less risk of macrosomia  when compared with glyburide. When compared to insulin, Metformin had lower maternal weight gain, increased  birth and lower gestational age at delivery and lower incidence of gestational hypertension. There was a trend for less  hypoglycemia and high failure rate of 30-35%, when compared to insulin.  In addition, the lack of long term data on glyburide and metformin use regarding safety was addressed and recommended use of Insulin for treatment, which is the gold standard, with excellent efficiency and safety, albeit more inconvenience.  Questions were answered.                  Log reviewed. After counseling on Insulin use, side effects and administration, she agreed to start outpatient treatment with insulin  8 units of NPH at bedtime and 8 units of Humalog in the morning. With insulin use/metformin, there is risk for hypoglycemia. I discussed symptoms of hypoglycemia with recommendation to assess blood sugar then eat something high in sugar. I informed her that the best way to decrease episodes of low blood sugar is well balanced healthy diabetic diet with appropriate snacks between meals. Brochures given. Questions answered.       The patient was instructed to check blood sugar four times per day and call in a few days with her blood sugars to make further adjustments of dose of Insulin. The goal of treatment is to keep pre-prandial blood sugars below 90 and one hour postprandial below 140.     She needs to do fetal kick counts throughout the pregnancy.    The association of gestational diabetes (50%) with adult-onset type 2 diabetes mellitus was previously discussed.  The importance of losing weight after the pregnancy was emphasized, as well as doing a 75 g sugar test after postpartum exam and to have annual checkups every 1-3 years for blood sugars to make sure she does not develop diabetes.        Follow up in about 2 weeks (around 2023) for [facility] follow-up, Send sugars weekly; 4 weeks [facility]  followup, repeat US twins.     No future appointments.       There is adequate interval growth with no anomalies seen.  Fetal anatomy scan mostly completed.  Blood sugars are uncontrolled.  With uncontrolled diabetes reviewed with her the options of insulin versus metformin versus glyburide and agreed to start insulin as discussed above.  Patient was shown how to self inject and educated on the use of insulin and reviewed diet issues with her.    Patient was evaluated by KARISSA Velasco and Dr. Hardy.  Final assessment and recommendations as stated above were made by Dr. Hardy.     Components of this note were documented using voice recognition systems; and are subject to errors not corrected at proof reading.  Please contact the author for any clarifications.

## 2023-10-27 ENCOUNTER — TELEPHONE (OUTPATIENT)
Dept: MATERNAL FETAL MEDICINE | Facility: CLINIC | Age: 18
End: 2023-10-27
Payer: MEDICAID

## 2023-10-27 NOTE — TELEPHONE ENCOUNTER
I spoke with the patient about being hesitant to self inject the insulin in the morning.  Discussed with her the options of insulin versus metformin.  Risk of hypoglycemia discussed.  Patient agreed to do the insulin and will monitor sugars and let us know if they are low and bring it to the office visit next time.

## 2023-10-30 ENCOUNTER — TELEPHONE (OUTPATIENT)
Dept: MATERNAL FETAL MEDICINE | Facility: CLINIC | Age: 18
End: 2023-10-30
Payer: MEDICAID

## 2023-10-30 DIAGNOSIS — O24.419 GESTATIONAL DIABETES MELLITUS (GDM) IN SECOND TRIMESTER, GESTATIONAL DIABETES METHOD OF CONTROL UNSPECIFIED: ICD-10-CM

## 2023-10-30 RX ORDER — LANCETS
EACH MISCELLANEOUS
Qty: 120 EACH | Refills: 3 | Status: SHIPPED | OUTPATIENT
Start: 2023-10-30 | End: 2023-12-01

## 2023-10-30 NOTE — TELEPHONE ENCOUNTER
----- Message from Chrystal Augustin sent at 10/30/2023  9:51 AM CDT -----  Regarding: refill  Patient needs a refill on her test strips & lancets and can be reached at [phone]    Ordered Pt's test strips and lancets. Called Pt to let her know her refills were ordered.

## 2023-11-01 ENCOUNTER — TELEPHONE (OUTPATIENT)
Dept: MATERNAL FETAL MEDICINE | Facility: CLINIC | Age: 18
End: 2023-11-01
Payer: MEDICAID

## 2023-11-01 NOTE — TELEPHONE ENCOUNTER
----- Message from Phoebe Leigh MA sent at 11/1/2023  1:15 PM CDT -----  Regarding: Insulin  Pt dropped bottle of insulin and the whole bottle broke. Needs another refill    Called Pt to confirm which bottle she dropped. Call pharmacy and ordered another one, client did have refills left and insurance did pay for the refill. Called Pt to inform her that her medicine would be ready within the hour.

## 2023-11-03 ENCOUNTER — TELEPHONE (OUTPATIENT)
Dept: MATERNAL FETAL MEDICINE | Facility: CLINIC | Age: 18
End: 2023-11-03
Payer: MEDICAID

## 2023-11-03 NOTE — TELEPHONE ENCOUNTER
----- Message from Phoebe Leigh MA sent at 11/3/2023  3:09 PM CDT -----  Regarding: Question  Question about insulin she is taking. She's wondering which one she is supposed to take when her sugars spike    Called pt to answer her questions all questions answered

## 2023-11-07 ENCOUNTER — OFFICE VISIT (OUTPATIENT)
Dept: MATERNAL FETAL MEDICINE | Facility: CLINIC | Age: 18
End: 2023-11-07
Payer: MEDICAID

## 2023-11-07 VITALS
HEART RATE: 113 BPM | DIASTOLIC BLOOD PRESSURE: 67 MMHG | BODY MASS INDEX: 25.11 KG/M2 | WEIGHT: 133 LBS | HEIGHT: 61 IN | SYSTOLIC BLOOD PRESSURE: 106 MMHG

## 2023-11-07 DIAGNOSIS — O09.90 AT HIGH RISK FOR COMPLICATIONS OF INTRAUTERINE PREGNANCY (IUP): ICD-10-CM

## 2023-11-07 DIAGNOSIS — O24.414 INSULIN CONTROLLED GESTATIONAL DIABETES MELLITUS (GDM) IN THIRD TRIMESTER: ICD-10-CM

## 2023-11-07 DIAGNOSIS — Q78.0 OSTEOGENESIS IMPERFECTA TYPE I: ICD-10-CM

## 2023-11-07 DIAGNOSIS — O30.043 DICHORIONIC DIAMNIOTIC TWIN PREGNANCY IN THIRD TRIMESTER: ICD-10-CM

## 2023-11-07 DIAGNOSIS — F31.9 BIPOLAR DISEASE DURING PREGNANCY IN THIRD TRIMESTER: Primary | ICD-10-CM

## 2023-11-07 DIAGNOSIS — O99.343 BIPOLAR DISEASE DURING PREGNANCY IN THIRD TRIMESTER: Primary | ICD-10-CM

## 2023-11-07 PROCEDURE — 99213 OFFICE O/P EST LOW 20 MIN: CPT | Mod: TH,S$GLB,,

## 2023-11-07 PROCEDURE — 1159F PR MEDICATION LIST DOCUMENTED IN MEDICAL RECORD: ICD-10-PCS | Mod: CPTII,S$GLB,,

## 2023-11-07 PROCEDURE — 1160F PR REVIEW ALL MEDS BY PRESCRIBER/CLIN PHARMACIST DOCUMENTED: ICD-10-PCS | Mod: CPTII,S$GLB,,

## 2023-11-07 PROCEDURE — 3074F PR MOST RECENT SYSTOLIC BLOOD PRESSURE < 130 MM HG: ICD-10-PCS | Mod: CPTII,S$GLB,,

## 2023-11-07 PROCEDURE — 1159F MED LIST DOCD IN RCRD: CPT | Mod: CPTII,S$GLB,,

## 2023-11-07 PROCEDURE — 1160F RVW MEDS BY RX/DR IN RCRD: CPT | Mod: CPTII,S$GLB,,

## 2023-11-07 PROCEDURE — 3078F DIAST BP <80 MM HG: CPT | Mod: CPTII,S$GLB,,

## 2023-11-07 PROCEDURE — 3008F BODY MASS INDEX DOCD: CPT | Mod: CPTII,S$GLB,,

## 2023-11-07 PROCEDURE — 3078F PR MOST RECENT DIASTOLIC BLOOD PRESSURE < 80 MM HG: ICD-10-PCS | Mod: CPTII,S$GLB,,

## 2023-11-07 PROCEDURE — 99213 PR OFFICE/OUTPT VISIT, EST, LEVL III, 20-29 MIN: ICD-10-PCS | Mod: TH,S$GLB,,

## 2023-11-07 PROCEDURE — 3008F PR BODY MASS INDEX (BMI) DOCUMENTED: ICD-10-PCS | Mod: CPTII,S$GLB,,

## 2023-11-07 PROCEDURE — 3074F SYST BP LT 130 MM HG: CPT | Mod: CPTII,S$GLB,,

## 2023-11-07 NOTE — PROGRESS NOTES
Maternal Fetal Medicine Follow Up Consult    Subjective     Patient ID: [id number]    Chief Complaint: MFM follow up       HPI: Rosendo Covarrubias is a 18 y.o. female  at 30w0d gestation with Estimated Date of Delivery: 24  who is here for follow  up consultation by M.  Patient has dichorionic diamniotic twins, that was naturally conceived.  She was recommended to take low-dose aspirin for preeclampsia prophylaxis, which she is taking daily. She has history of bipolar disorder for a few years has been on Latuda for about 3-4 years and is well controlled.  She reports is not taking the Latuda due to issues with pills causing nausea.  She would like to stay off at this time.  Patient is followed by psychiatry service, Terrie Chambers.  Patient has history of scoliosis has been stable and no follow-up or treatment is needed.  Patient has history of osteogenesis imperfecta type 1.  Patient reported that she would a fracture when she was young but does not remember details, reports was probably in her leg.  She was recently diagnosed with gestational diabetes and is on insulin.         Interval history since last M visit: None.. She denies any leaking fluid, vaginal bleeding, contractions, decreased fetal movement. Denies headaches, visual disturbances, or epigastric pain    Pregnancy complications include:   Patient Active Problem List   Diagnosis    Dichorionic diamniotic twin pregnancy in third trimester    At high risk for preeclampsia complications of intrauterine pregnancy (IUP)    Osteogenesis imperfecta type I    Bipolar disease during pregnancy in third trimester    Insulin controlled gestational diabetes mellitus (GDM) in third trimester        No changes to medical, surgical, family, social, or obstetric history.    Medications:  Current Outpatient Medications   Medication Instructions    aspirin (ECOTRIN) 81 mg, Oral, Daily    blood sugar diagnostic Strp Use 1 strip via device 4 times daily    blood-glucose  "meter kit Use device 4 times daily to check blood sugars    insulin lispro (HUMALOG U-100 INSULIN) 100 unit/mL injection INJECT 8 UNITS IN THE MORNING.    insulin NPH (HUMULIN N NPH U-100 INSULIN) 100 unit/mL injection INJECT 8 UNITS AT BEDTIME.    insulin syringe-needle U-100 0.5 mL 31 gauge x 5/16" Syrg USE TO SKIN TWICE DAILY AS DIRECTED.    lancets (LANCETS,THIN) Misc Use 1 lancet to skin 4 times daily to check blood sugars    PNV no.153/FA/om3/dha/epa/fish (PRENATAL GUMMIES ORAL) Oral       Review of Systems   12 point review of systems conducted, negative except as stated in the history of present illness. See HPI for details.      Objective     Visit Vitals  /67 (BP Location: Left arm, Patient Position: Sitting, BP Method: Medium (Automatic))   Pulse (!) 113   Ht 5' 1" (1.549 m)   Wt 60.3 kg (133 lb)   LMP 2023 (Approximate)   BMI 25.13 kg/m²        Physical Exam  Vitals and nursing note reviewed.   Constitutional:       Appearance: Normal appearance.   HENT:      Head: Normocephalic and atraumatic.      Nose: Nose normal. No congestion.   Pulmonary:      Effort: Pulmonary effort is normal.   Skin:     Findings: No rash.   Neurological:      Mental Status: She is alert and oriented to person, place, and time.   Psychiatric:         Mood and Affect: Mood normal.         Behavior: Behavior normal.         Thought Content: Thought content normal.         Judgment: Judgment normal.           ASSESSMENT/PLAN:     18 y.o.  female with IUP at 30w0d    Dichorionic diamniotic twin pregnancy   Interval growth for both twins has been adequate.   Fetal sizes are AGA and concordant, with FGD 1.8%.   The EFW percentile for twin A is 54%, and the EFW is 1254 g on 10/24/2023.  The EFW percentile for twin B is 55%, and the EFW is 1277 g on 10/24/2023.  AFV is normal.      on both twins per Doppler.    She is aware of risks associated with twins including higher risk of anomalies, fetal growth " restriction, hypertensive disorders, gestational diabetes, anemia, cerebral palsy,  labor/delivery and postpartum hemorrhage with twin gestation.  She is aware of the limitations of ultrasound, including checking anomalies, with need for  evaluation.    Low dose aspirin as discussed.    Due to increased risk factors, recommend continue serial follow-up ultrasounds every 4 weeks until end of pregnancy.  If evidence of fetal growth restriction or other complications occur, then closer fetal surveillance will be needed.    If no additional risk factors are present, then optimal time of delivery is at 38 weeks with dichorionic diamniotic twins. The importance of doing fetal kick counts three times a day and resting in a lateral recumbent position and reporting immediately at any time there is any decreased fetal movement even if the same day of testing was emphasized. She is to come in at anytime if there is increased cramping, vaginal discharge, vaginal bleeding, or decreased fetal movement.         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  With her mild disease most likely pregnancy will be tolerated well with no additional treatment.  She is not interested in prenatal diagnosis with amniocentesis offered and declined.      Bipolar disease in pregnancy  Reviewed risks with depression and risks/benefits of medication use in pregnancy.    Although discontinuing the medication for a few weeks before delivery  has been suggested, to avoid  withdrawal, the potential risks to mom and lack of proven benefit from this approach, makes continuing medication till delivery a reasonable option.With symptom control, Agreed to stay off of the Latuda at this time and keep follow-up with psychiatrist.    She was also advised to report any worsening of symptoms or SI/HI tendencies immediately to provider/ER for prompt  intervention. She was advised to continue follow up care with her Mental Health provider.       At high risk for preeclampsia  With her increased risk for preeclampsia, she agreed to continue low-dose aspirin daily until delivery.  Preeclampsia precautions reviewed.       Gestational diabetes mellitus  Risks associated with diabetes in pregnancy include higher risk for polyhydramnios, fetal macrosomia, lower Apgar scores and  metabolic complications (hypoglycemia, hyperbilirubinemia, hypocalcemia, erythema) and developing preeclampsia.     Continue 2200 calorie diabetic diet. It is recommended that she have 30 grams of carbohydrates with breakfast, and 60 grams with lunch and dinner. In addition, she should have three snacks in between meals with 15 grams of carbohydrates and at bedtime with 30 grams of carbohydrates. The importance of avoiding any significant weight gain till the end of the pregnancy was reviewed.                  Log reviewed. Patient advised to adjust insulin to 8 units of Humalog with breakfast and 8 units of NPH at bedtime.  Continue corrected formula of 1 unit Humalog for every 8 mg over 140.     The patient was instructed to check blood sugar four times per day and call me in a few days with her blood sugars to make further adjustments of dose of Insulin. The goal of treatment is to keep pre-prandial blood sugars below 90 and one hour postprandial below 140.     Fetal kick count instructions given.    The association of gestational diabetes (50%) with adult-onset type 2 diabetes mellitus was reviewed.  The importance of losing weight after the pregnancy was emphasized, as well as doing a 75 g sugar test after postpartum exam and to have annual checkups every 1-3 years for blood sugars to make sure she does not develop diabetes.        Follow up in about 2 weeks (around 2023) for MFM follow-up, Repeat ultrasound, BPP, Twins.     Future Appointments   Date Time Provider Department  [place]   11/21/2023  8:30 AM Enrrique Hardy MD [facility]   11/21/2023  8:30 AM ROOM 1 AND 2, [facility] Benson PA-C    Components of this note were documented using voice recognition systems; and are subject to errors not corrected at proof reading.  Please contact the author for any clarifications.

## 2023-11-15 ENCOUNTER — TELEPHONE (OUTPATIENT)
Dept: MATERNAL FETAL MEDICINE | Facility: CLINIC | Age: 18
End: 2023-11-15
Payer: MEDICAID

## 2023-11-15 NOTE — TELEPHONE ENCOUNTER
----- Message from Chrystal Augustin sent at 11/15/2023  2:42 PM CST -----  Regarding: Blood Sugar  Patient called & wants a call back at [phone] because her blood sugar was high yesterday      Called and took down patients sugar log for the week. Gave to Dr Hardy. There were no changes to her meds. Called Pt back to inform her that there were no changes to her meds and to keep writing down her blood sugar numbers.

## 2023-11-16 DIAGNOSIS — O99.343 BIPOLAR DISEASE DURING PREGNANCY IN THIRD TRIMESTER: ICD-10-CM

## 2023-11-16 DIAGNOSIS — F31.9 BIPOLAR DISEASE DURING PREGNANCY IN THIRD TRIMESTER: ICD-10-CM

## 2023-11-16 DIAGNOSIS — O24.414 INSULIN CONTROLLED GESTATIONAL DIABETES MELLITUS (GDM) IN THIRD TRIMESTER: Primary | ICD-10-CM

## 2023-11-16 DIAGNOSIS — O24.419 GESTATIONAL DIABETES MELLITUS (GDM) IN SECOND TRIMESTER, GESTATIONAL DIABETES METHOD OF CONTROL UNSPECIFIED: ICD-10-CM

## 2023-11-16 NOTE — TELEPHONE ENCOUNTER
Physician Progress Note      PATIENT:               Aris Steele  CSN #:                  [id number]  :                       1929  ADMIT DATE:       2021 6:52 PM  DISCH DATE:  RESPONDING  PROVIDER #:        Florentino Adames MD          QUERY TEXT:    Patient admitted with Atrial fibrillation with RVR. Per wound care note on 21, noted to also have a Stage 3 Left heel pressure ulcer. If possible, please document in progress notes and discharge summary the location, present on admission status and stage of the pressure ulcer: The medical record reflects the following:  Risk Factors: 79 yo M Admitted with Atrial fibrillation  Clinical Indicators:  wound care assessment states \"Left heel - 2.4 x 2 x 01 cm, moist pink base budding through thin layer of pale white/yellow slough, resurfaced surrounding, no redness, no odor or purulence, small amount of drainage (serous); Stage 3 PI POA. \"  Treatment: Honey gel and foam dressing to left heel ulcer; cleanse with wound cleanser or saline, change every three days. Stage 1:  Non-blanchable erythema of intact skin  Stage 2:  Abrasion, Blister, Partial-thickness skin loss, with exposed dermis  Stage 3:  Full-thickness skin loss with damage or necrosis of subcutaneous tissue  Stage 4:  Full-thickness skin & soft tissue loss through to underlying muscle, tendon or bone  Unstageable: Obscured full-thickness skin & tissue loss  Options provided:  -- Stage 3 Pressure Ulcer of the Left Heel  present on admission  -- Stage 3 Pressure Ulcer of the Left Heel not present on admission  -- Other - I will add my own diagnosis  -- Disagree - Not applicable / Not valid  -- Disagree - Clinically unable to determine / Unknown  -- Refer to Clinical Documentation Reviewer    PROVIDER RESPONSE TEXT:    This patient has a stage 3 pressure ulcer of the Left Heel which was present on admission.     Query created by: Joyce Arango on 2021 8:40 Spoke to pt she needed to refill her test strips however the pharmacy will not cover them till the 21st and the pharmacy told the pt they make a relion meter and strips for around 15.00 so I called the pt back to inform her again, so if she runs out again she will always  have the meter so she can go buy strips for that machine if she runs out again  just to get her through till the others can be filled    AM      Electronically signed by:  Roberto Carlos Major MD 8/31/2021 12:20 PM

## 2023-11-21 ENCOUNTER — OFFICE VISIT (OUTPATIENT)
Dept: MATERNAL FETAL MEDICINE | Facility: CLINIC | Age: 18
End: 2023-11-21
Payer: MEDICAID

## 2023-11-21 ENCOUNTER — PROCEDURE VISIT (OUTPATIENT)
Dept: MATERNAL FETAL MEDICINE | Facility: CLINIC | Age: 18
End: 2023-11-21
Payer: MEDICAID

## 2023-11-21 VITALS
HEART RATE: 84 BPM | SYSTOLIC BLOOD PRESSURE: 129 MMHG | DIASTOLIC BLOOD PRESSURE: 73 MMHG | HEIGHT: 61 IN | BODY MASS INDEX: 25.49 KG/M2 | WEIGHT: 135 LBS

## 2023-11-21 DIAGNOSIS — Q78.0 OSTEOGENESIS IMPERFECTA TYPE I: ICD-10-CM

## 2023-11-21 DIAGNOSIS — O24.414 INSULIN CONTROLLED GESTATIONAL DIABETES MELLITUS (GDM) IN THIRD TRIMESTER: ICD-10-CM

## 2023-11-21 DIAGNOSIS — O99.343 BIPOLAR DISEASE DURING PREGNANCY IN THIRD TRIMESTER: Primary | ICD-10-CM

## 2023-11-21 DIAGNOSIS — O30.043 DICHORIONIC DIAMNIOTIC TWIN PREGNANCY IN THIRD TRIMESTER: ICD-10-CM

## 2023-11-21 DIAGNOSIS — O09.90 AT HIGH RISK FOR COMPLICATIONS OF INTRAUTERINE PREGNANCY (IUP): ICD-10-CM

## 2023-11-21 DIAGNOSIS — F31.9 BIPOLAR DISEASE DURING PREGNANCY IN THIRD TRIMESTER: Primary | ICD-10-CM

## 2023-11-21 PROCEDURE — 1159F MED LIST DOCD IN RCRD: CPT | Mod: CPTII,S$GLB,, | Performed by: OBSTETRICS & GYNECOLOGY

## 2023-11-21 PROCEDURE — 3008F PR BODY MASS INDEX (BMI) DOCUMENTED: ICD-10-PCS | Mod: CPTII,S$GLB,, | Performed by: OBSTETRICS & GYNECOLOGY

## 2023-11-21 PROCEDURE — 3078F PR MOST RECENT DIASTOLIC BLOOD PRESSURE < 80 MM HG: ICD-10-PCS | Mod: CPTII,S$GLB,, | Performed by: OBSTETRICS & GYNECOLOGY

## 2023-11-21 PROCEDURE — 76816 PR  US,PREGNANT UTERUS,F/U,TRANSABD APP: ICD-10-PCS | Mod: 59,S$GLB,, | Performed by: OBSTETRICS & GYNECOLOGY

## 2023-11-21 PROCEDURE — 3008F BODY MASS INDEX DOCD: CPT | Mod: CPTII,S$GLB,, | Performed by: OBSTETRICS & GYNECOLOGY

## 2023-11-21 PROCEDURE — 76819 FETAL BIOPHYS PROFIL W/O NST: CPT | Mod: 59,S$GLB,, | Performed by: OBSTETRICS & GYNECOLOGY

## 2023-11-21 PROCEDURE — 3074F SYST BP LT 130 MM HG: CPT | Mod: CPTII,S$GLB,, | Performed by: OBSTETRICS & GYNECOLOGY

## 2023-11-21 PROCEDURE — 99214 OFFICE O/P EST MOD 30 MIN: CPT | Mod: TH,S$GLB,, | Performed by: OBSTETRICS & GYNECOLOGY

## 2023-11-21 PROCEDURE — 76816 OB US FOLLOW-UP PER FETUS: CPT | Mod: S$GLB,,, | Performed by: OBSTETRICS & GYNECOLOGY

## 2023-11-21 PROCEDURE — 3074F PR MOST RECENT SYSTOLIC BLOOD PRESSURE < 130 MM HG: ICD-10-PCS | Mod: CPTII,S$GLB,, | Performed by: OBSTETRICS & GYNECOLOGY

## 2023-11-21 PROCEDURE — 99214 PR OFFICE/OUTPT VISIT, EST, LEVL IV, 30-39 MIN: ICD-10-PCS | Mod: TH,S$GLB,, | Performed by: OBSTETRICS & GYNECOLOGY

## 2023-11-21 PROCEDURE — 1159F PR MEDICATION LIST DOCUMENTED IN MEDICAL RECORD: ICD-10-PCS | Mod: CPTII,S$GLB,, | Performed by: OBSTETRICS & GYNECOLOGY

## 2023-11-21 PROCEDURE — 3078F DIAST BP <80 MM HG: CPT | Mod: CPTII,S$GLB,, | Performed by: OBSTETRICS & GYNECOLOGY

## 2023-11-21 PROCEDURE — 76819 PR US, OB, FETAL BIOPHYSICAL, W/O NST: ICD-10-PCS | Mod: 59,S$GLB,, | Performed by: OBSTETRICS & GYNECOLOGY

## 2023-11-21 NOTE — PROGRESS NOTES
Maternal Fetal Medicine Follow Up Consult    Subjective     Patient ID: [id number]    Chief Complaint: M follow up with US (Di/ Di twin pregnancy, Bipolar disease and GDM.  Patient did not bring sugar log  Fasting 64- 102, Post meals .)      HPI: Rosendo Covarrubias is a 18 y.o. female  at 32w0d gestation with Estimated Date of Delivery: 24  who is here for follow  up consultation by M.  Patient has dichorionic diamniotic twins, that was naturally conceived.  She was recommended to take low-dose aspirin for preeclampsia prophylaxis, which she is taking daily. She has history of bipolar disorder for a few years has been on Latuda for about 3-4 years and was well controlled.  She reports is not taking the Latuda due to issues with pills causing nausea.  She would like to stay off at this time.  Patient is followed by psychiatry service, Terrie Chambers.  Patient has history of scoliosis has been stable and no follow-up or treatment is needed.  Patient also has history of osteogenesis imperfecta type 1.  Patient reported that she had a fracture when she was young but does not remember details, reports was probably in her leg.  She has gestational diabetes and is on insulin, 8 units of Humalog with breakfast and 8 units of NPH at bedtime.  Patient forgot to bring her sugar log with her.  She reports fasting blood sugar in the 90s but today was 64, and post meals are 100-130.         Interval history since last M visit: None.. She denies any leaking fluid, vaginal bleeding, contractions, decreased fetal movement. Denies headaches, visual disturbances, or epigastric pain    Pregnancy complications include:   Patient Active Problem List   Diagnosis    Dichorionic diamniotic twin pregnancy in third trimester    At high risk for preeclampsia complications of intrauterine pregnancy (IUP)    Osteogenesis imperfecta type I    Bipolar disease during pregnancy in third trimester    Insulin controlled gestational diabetes  "mellitus (GDM) in third trimester        No changes to medical, surgical, family, social, or obstetric history.    Medications:  Current Outpatient Medications   Medication Instructions    aspirin (ECOTRIN) 81 mg, Oral, Daily    blood sugar diagnostic Strp Use 1 strip via device 4 times daily    blood-glucose meter kit Use device 4 times daily to check blood sugars    insulin lispro (HUMALOG U-100 INSULIN) 100 unit/mL injection INJECT 8 UNITS IN THE MORNING.    insulin NPH (HUMULIN N NPH U-100 INSULIN) 100 unit/mL injection INJECT 8 UNITS AT BEDTIME.    insulin syringe-needle U-100 0.5 mL 31 gauge x 5/16" Syrg USE TO SKIN TWICE DAILY AS DIRECTED.    lancets (LANCETS,THIN) Misc Use 1 lancet to skin 4 times daily to check blood sugars    PNV no.153/FA/om3/dha/epa/fish (PRENATAL GUMMIES ORAL) Oral       Review of Systems   12 point review of systems conducted, negative except as stated in the history of present illness. See HPI for details.      Objective     Visit Vitals  /73 (BP Location: Left arm, Patient Position: Sitting, BP Method: Pediatric (Automatic))   Pulse 84   Ht 5' 1" (1.549 m)   Wt 61.2 kg (135 lb)   LMP 2023 (Approximate)   BMI 25.51 kg/m²        Physical Exam  Vitals and nursing note reviewed.   Constitutional:       Appearance: Normal appearance.   HENT:      Head: Normocephalic and atraumatic.      Nose: Nose normal. No congestion.   Pulmonary:      Effort: Pulmonary effort is normal.   Skin:     Findings: No rash.   Neurological:      Mental Status: She is alert and oriented to person, place, and time.   Psychiatric:         Mood and Affect: Mood normal.         Behavior: Behavior normal.         Thought Content: Thought content normal.         Judgment: Judgment normal.           ASSESSMENT/PLAN:     18 y.o.  female with IUP at 32w0d    Dichorionic diamniotic twin pregnancy   Interval growth for both twins has been adequate.   Fetal sizes are AGA and concordant.   The EFW percentile " for twin A is 28%,   AC at 35%, and the EFW is 1879 g.  On 2023  The EFW percentile for twin B is 34%,  AC at 45%,  and the EFW is 1975 g. On 2023   BPP is 8/8 on both fetuses with normal amniotic fluid volume.  No significant fetal growth discordance.    She is aware of risks associated with twins including higher risk of anomalies, fetal growth restriction, hypertensive disorders, gestational diabetes, anemia, cerebral palsy,  labor/delivery and postpartum hemorrhage with twin gestation.  She is aware of the limitations of ultrasound, including checking anomalies, with need for  evaluation.    Low dose aspirin as discussed.    Due to increased risk factors, recommend continue serial follow-up ultrasounds every 4 weeks until end of pregnancy.  If evidence of fetal growth restriction or other complications occur, then closer fetal surveillance will be needed.    If no additional risk factors are present, then optimal time of delivery is at 38 weeks with dichorionic diamniotic twins. The importance of doing fetal kick counts three times a day and resting in a lateral recumbent position and reporting immediately at any time there is any decreased fetal movement even if the same day of testing was emphasized. She is to come in at anytime if there is increased cramping, vaginal discharge, vaginal bleeding, or decreased fetal movement.         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  With her mild disease most likely pregnancy will be tolerated well with no additional treatment.  She is not interested in prenatal diagnosis with amniocentesis offered and declined.      Bipolar disease in pregnancy  Reviewed risks with depression and risks/benefits of medication use in pregnancy.    Although discontinuing the medication for a few weeks before delivery  has been suggested, to avoid  withdrawal,  the potential risks to mom and lack of proven benefit from this approach, makes continuing medication till delivery a reasonable option.With symptom control, Agreed to stay off of the Latuda at this time and keep follow-up with psychiatrist.    She was also advised to report any worsening of symptoms or SI/HI tendencies immediately to provider/ER for prompt intervention. She was advised to continue follow up care with her Mental Health provider.       At high risk for preeclampsia  With her increased risk for preeclampsia, she agreed to continue low-dose aspirin daily until delivery.  Preeclampsia precautions reviewed.       Gestational diabetes mellitus  Risks associated with diabetes in pregnancy include higher risk for polyhydramnios, fetal macrosomia, lower Apgar scores and  metabolic complications (hypoglycemia, hyperbilirubinemia, hypocalcemia, erythema) and developing preeclampsia.     Continue 2200 calorie diabetic diet. It is recommended that she have 30 grams of carbohydrates with breakfast, and 60 grams with lunch and dinner. In addition, she should have three snacks in between meals with 15 grams of carbohydrates and at bedtime with 30 grams of carbohydrates. The importance of avoiding any significant weight gain till the end of the pregnancy was reviewed.                  Reviewed the reported blood sugars.  There is no reported pattern of elevations to make adjustments but emphasized importance of calling her sugars next Monday and we will see her the following Friday.  Patient also reported an occasional blood sugar going to 200.. Patient advised to continue insulin  8 units of Humalog with breakfast and 8 units of NPH at bedtime.  Continue corrected formula of 1 unit Humalog for every 8 mg over 140.      The patient was instructed to check blood sugar four times per day. The goal of treatment is to keep pre-prandial blood sugars below 90 and one hour postprandial below 140.     Fetal kick  count instructions given.    The association of gestational diabetes (50%) with adult-onset type 2 diabetes mellitus was reviewed.  The importance of losing weight after the pregnancy was emphasized, as well as doing a 75 g sugar test after postpartum exam and to have annual checkups every 1-3 years for blood sugars to make sure she does not develop diabetes.        With some blood sugars elevated up to 200 will plan to see her in 10 days and have her bring the sugar log with her.    Follow up for 3.5 weeks, MFM follow-up, BPP, Repeat  ultrasound, Remind to call blood sugars weekly, Twins.     No future appointments.    Patient was evaluated and examined by Dr. Hardy. KARISSA Velasco, helped in pre charting of part of note.     Components of this note were documented using voice recognition systems; and are subject to errors not corrected at proof reading.  Please contact the author for any clarifications.  I spent at least 25 minutes on this patient on 11/21/2023 , separate from any other billable services,  including one or more of the following activities: reviewing old records, performing appropriate history and exam, medical decision making, counseling and education, calling in prescriptions, ordering tests, ordering labs, discussing the patient with staff or another provider and creating this documentation.

## 2023-11-30 DIAGNOSIS — O24.414 INSULIN CONTROLLED GESTATIONAL DIABETES MELLITUS (GDM) IN THIRD TRIMESTER: ICD-10-CM

## 2023-11-30 DIAGNOSIS — O30.043 DICHORIONIC DIAMNIOTIC TWIN PREGNANCY IN THIRD TRIMESTER: ICD-10-CM

## 2023-11-30 DIAGNOSIS — O09.90 AT HIGH RISK FOR COMPLICATIONS OF INTRAUTERINE PREGNANCY (IUP): ICD-10-CM

## 2023-11-30 DIAGNOSIS — F31.9 BIPOLAR DISEASE DURING PREGNANCY IN THIRD TRIMESTER: Primary | ICD-10-CM

## 2023-11-30 DIAGNOSIS — O99.343 BIPOLAR DISEASE DURING PREGNANCY IN THIRD TRIMESTER: Primary | ICD-10-CM

## 2023-12-01 ENCOUNTER — OFFICE VISIT (OUTPATIENT)
Dept: MATERNAL FETAL MEDICINE | Facility: CLINIC | Age: 18
End: 2023-12-01
Payer: MEDICAID

## 2023-12-01 ENCOUNTER — PROCEDURE VISIT (OUTPATIENT)
Dept: MATERNAL FETAL MEDICINE | Facility: CLINIC | Age: 18
End: 2023-12-01
Payer: MEDICAID

## 2023-12-01 VITALS
BODY MASS INDEX: 26.27 KG/M2 | DIASTOLIC BLOOD PRESSURE: 71 MMHG | HEART RATE: 85 BPM | SYSTOLIC BLOOD PRESSURE: 104 MMHG | WEIGHT: 139.13 LBS | HEIGHT: 61 IN

## 2023-12-01 DIAGNOSIS — O99.343 BIPOLAR DISEASE DURING PREGNANCY IN THIRD TRIMESTER: Primary | ICD-10-CM

## 2023-12-01 DIAGNOSIS — F31.9 BIPOLAR DISEASE DURING PREGNANCY IN THIRD TRIMESTER: Primary | ICD-10-CM

## 2023-12-01 DIAGNOSIS — O30.043 DICHORIONIC DIAMNIOTIC TWIN PREGNANCY IN THIRD TRIMESTER: ICD-10-CM

## 2023-12-01 DIAGNOSIS — O99.343 BIPOLAR DISEASE DURING PREGNANCY IN THIRD TRIMESTER: ICD-10-CM

## 2023-12-01 DIAGNOSIS — O24.414 INSULIN CONTROLLED GESTATIONAL DIABETES MELLITUS (GDM) IN THIRD TRIMESTER: ICD-10-CM

## 2023-12-01 DIAGNOSIS — Q78.0 OSTEOGENESIS IMPERFECTA TYPE I: ICD-10-CM

## 2023-12-01 DIAGNOSIS — O09.90 AT HIGH RISK FOR COMPLICATIONS OF INTRAUTERINE PREGNANCY (IUP): ICD-10-CM

## 2023-12-01 DIAGNOSIS — F31.9 BIPOLAR DISEASE DURING PREGNANCY IN THIRD TRIMESTER: ICD-10-CM

## 2023-12-01 PROCEDURE — 3078F PR MOST RECENT DIASTOLIC BLOOD PRESSURE < 80 MM HG: ICD-10-PCS | Mod: CPTII,S$GLB,, | Performed by: OBSTETRICS & GYNECOLOGY

## 2023-12-01 PROCEDURE — 3008F PR BODY MASS INDEX (BMI) DOCUMENTED: ICD-10-PCS | Mod: CPTII,S$GLB,, | Performed by: OBSTETRICS & GYNECOLOGY

## 2023-12-01 PROCEDURE — 99213 PR OFFICE/OUTPT VISIT, EST, LEVL III, 20-29 MIN: ICD-10-PCS | Mod: TH,S$GLB,, | Performed by: OBSTETRICS & GYNECOLOGY

## 2023-12-01 PROCEDURE — 76819 FETAL BIOPHYS PROFIL W/O NST: CPT | Mod: 59,S$GLB,, | Performed by: OBSTETRICS & GYNECOLOGY

## 2023-12-01 PROCEDURE — 3008F BODY MASS INDEX DOCD: CPT | Mod: CPTII,S$GLB,, | Performed by: OBSTETRICS & GYNECOLOGY

## 2023-12-01 PROCEDURE — 3074F SYST BP LT 130 MM HG: CPT | Mod: CPTII,S$GLB,, | Performed by: OBSTETRICS & GYNECOLOGY

## 2023-12-01 PROCEDURE — 3074F PR MOST RECENT SYSTOLIC BLOOD PRESSURE < 130 MM HG: ICD-10-PCS | Mod: CPTII,S$GLB,, | Performed by: OBSTETRICS & GYNECOLOGY

## 2023-12-01 PROCEDURE — 99213 OFFICE O/P EST LOW 20 MIN: CPT | Mod: TH,S$GLB,, | Performed by: OBSTETRICS & GYNECOLOGY

## 2023-12-01 PROCEDURE — 1159F PR MEDICATION LIST DOCUMENTED IN MEDICAL RECORD: ICD-10-PCS | Mod: CPTII,S$GLB,, | Performed by: OBSTETRICS & GYNECOLOGY

## 2023-12-01 PROCEDURE — 76819 PR US, OB, FETAL BIOPHYSICAL, W/O NST: ICD-10-PCS | Mod: 59,S$GLB,, | Performed by: OBSTETRICS & GYNECOLOGY

## 2023-12-01 PROCEDURE — 3078F DIAST BP <80 MM HG: CPT | Mod: CPTII,S$GLB,, | Performed by: OBSTETRICS & GYNECOLOGY

## 2023-12-01 PROCEDURE — 1159F MED LIST DOCD IN RCRD: CPT | Mod: CPTII,S$GLB,, | Performed by: OBSTETRICS & GYNECOLOGY

## 2023-12-01 RX ORDER — LANCETS 33 GAUGE
EACH MISCELLANEOUS 4 TIMES DAILY
COMMUNITY
Start: 2023-11-21

## 2023-12-01 NOTE — PROGRESS NOTES
Maternal Fetal Medicine Follow Up Consult    Subjective     Patient ID: [id number]    Chief Complaint: M FOLLOWUP W/US      HPI: Rosendo Covarrubias is a 18 y.o. female  at 33w3d gestation with Estimated Date of Delivery: 24  who is here for follow  up consultation by [facility].  Patient has dichorionic diamniotic twins, that was naturally conceived.  She was recommended to take low-dose aspirin for preeclampsia prophylaxis, which she is taking daily. She has history of bipolar disorder for a few years has been on Latuda for about 3-4 years and was well controlled.  She reports is not taking the Latuda due to issues with pills causing nausea.  She would like to stay off at this time.  Patient is followed by psychiatry service, Terrie Chambers.  Patient has history of scoliosis has been stable and no follow-up or treatment is needed.  Patient also has history of osteogenesis imperfecta type 1.  Patient reported that she had a fracture when she was young but does not remember details, reports was probably in her leg.  She has gestational diabetes and is on insulin, 8 units of Humalog with breakfast and 8 units of NPH at bedtime.          Interval history since last [facility] visit: None.. She denies any leaking fluid, vaginal bleeding, contractions, decreased fetal movement. Denies headaches, visual disturbances, or epigastric pain    Pregnancy complications include:   Patient Active Problem List   Diagnosis    Dichorionic diamniotic twin pregnancy in third trimester    At high risk for preeclampsia complications of intrauterine pregnancy (IUP)    Osteogenesis imperfecta type I    Bipolar disease during pregnancy in third trimester    Insulin controlled gestational diabetes mellitus (GDM) in third trimester        No changes to medical, surgical, family, social, or obstetric history.    Medications:  Current Outpatient Medications   Medication Instructions    aspirin (ECOTRIN) 81 mg, Oral, Daily    blood sugar diagnostic Strp Use  "1 strip via device 4 times daily    blood-glucose meter kit Use device 4 times daily to check blood sugars    insulin lispro (HUMALOG U-100 INSULIN) 100 unit/mL injection INJECT 8 UNITS IN THE MORNING.    insulin NPH (HUMULIN N NPH U-100 INSULIN) 100 unit/mL injection INJECT 8 UNITS AT BEDTIME.    insulin syringe-needle U-100 0.5 mL 31 gauge x 5/16" Syrg USE TO SKIN TWICE DAILY AS DIRECTED.    ONETOUCH DELICA PLUS LANCET 33 gauge Misc Topical (Top), 4 times daily    PNV no.153/FA/om3/dha/epa/fish (PRENATAL GUMMIES ORAL) Oral       Review of Systems   12 point review of systems conducted, negative except as stated in the history of present illness. See HPI for details.      Objective     Visit Vitals  /71 (BP Location: Left arm, Patient Position: Sitting, BP Method: Large (Automatic))   Pulse 85   Ht 5' 1" (1.549 m)   Wt 63.1 kg (139 lb 1.6 oz)   LMP 2023 (Approximate)   BMI 26.28 kg/m²        Physical Exam  Vitals and nursing note reviewed.   Constitutional:       Appearance: Normal appearance.   HENT:      Head: Normocephalic and atraumatic.      Nose: Nose normal. No congestion.   Pulmonary:      Effort: Pulmonary effort is normal.   Skin:     Findings: No rash.   Neurological:      Mental Status: She is alert and oriented to person, place, and time.   Psychiatric:         Mood and Affect: Mood normal.         Behavior: Behavior normal.         Thought Content: Thought content normal.         Judgment: Judgment normal.           ASSESSMENT/PLAN:     18 y.o.  female with IUP at 33w3d    Dichorionic diamniotic twin pregnancy   Interval growth for both twins has been adequate.   Fetal sizes are AGA and concordant.   The EFW percentile for twin A is 28%,   AC at 35%, and the EFW is 1879 g.  On 2023  The EFW percentile for twin B is 34%,  AC at 45%,  and the EFW is 1975 g. On 2023   BPP is 8/8 on both fetuses with normal amniotic fluid volume.  No significant fetal growth discordance.    She " is aware of risks associated with twins including higher risk of anomalies, fetal growth restriction, hypertensive disorders, gestational diabetes, anemia, cerebral palsy,  labor/delivery and postpartum hemorrhage with twin gestation.  She is aware of the limitations of ultrasound, including checking anomalies, with need for  evaluation.    Low dose aspirin as discussed.    Due to increased risk factors, recommend continue serial follow-up ultrasounds every 4 weeks until end of pregnancy.  If evidence of fetal growth restriction or other complications occur, then closer fetal surveillance will be needed.    If no additional risk factors are present, then optimal time of delivery is at 38 weeks with dichorionic diamniotic twins. The importance of doing fetal kick counts three times a day and resting in a lateral recumbent position and reporting immediately at any time there is any decreased fetal movement even if the same day of testing was emphasized. She is to come in at anytime if there is increased cramping, vaginal discharge, vaginal bleeding, or decreased fetal movement.         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  With her mild disease most likely pregnancy will be tolerated well with no additional treatment.  She is not interested in prenatal diagnosis with amniocentesis offered and declined.      Bipolar disease in pregnancy  Reviewed risks with depression and risks/benefits of medication use in pregnancy.    Although discontinuing the medication for a few weeks before delivery  has been suggested, to avoid  withdrawal, the potential risks to mom and lack of proven benefit from this approach, makes continuing medication till delivery a reasonable option.With symptom control, Agreed to stay off of the Latuda at this time and keep follow-up with psychiatrist.    She was also advised to  report any worsening of symptoms or SI/HI tendencies immediately to provider/ER for prompt intervention. She was advised to continue follow up care with her Mental Health provider.       At high risk for preeclampsia  With her increased risk for preeclampsia, she agreed to continue low-dose aspirin daily until delivery.  Preeclampsia precautions reviewed.       Gestational diabetes mellitus  Risks associated with diabetes in pregnancy include higher risk for polyhydramnios, fetal macrosomia, lower Apgar scores and  metabolic complications (hypoglycemia, hyperbilirubinemia, hypocalcemia, erythema) and developing preeclampsia.     Continue 2200 calorie diabetic diet. It is recommended that she have 30 grams of carbohydrates with breakfast, and 60 grams with lunch and dinner. In addition, she should have three snacks in between meals with 15 grams of carbohydrates and at bedtime with 30 grams of carbohydrates. The importance of avoiding any significant weight gain till the end of the pregnancy was reviewed.                  Reviewed the reported blood sugars. Patient advised to adjust insulin to 6 units of Humalog with breakfast and 8 units of NPH at bedtime.  Continue corrected formula of 1 unit Humalog for every 8 mg over 140.      The patient was instructed to check blood sugar four times per day. The goal of treatment is to keep pre-prandial blood sugars below 90 and one hour postprandial below 140.     Fetal kick count instructions given.    The association of gestational diabetes (50%) with adult-onset type 2 diabetes mellitus was reviewed.  The importance of losing weight after the pregnancy was emphasized, as well as doing a 75 g sugar test after postpartum exam and to have annual checkups every 1-3 years for blood sugars to make sure she does not develop diabetes.        Follow up in about 1 week (around 2023) for MFM follow-up, BPP, Twins.     No future appointments.      Patient was evaluated  and examined by Dr. Hardy. KARISSA Velasco, helped in pre charting of part of note.     Components of this note were documented using voice recognition systems; and are subject to errors not corrected at proof reading.  Please contact the author for any clarifications.  .

## 2023-12-04 DIAGNOSIS — O99.343 BIPOLAR DISEASE DURING PREGNANCY IN THIRD TRIMESTER: Primary | ICD-10-CM

## 2023-12-04 DIAGNOSIS — O30.043 DICHORIONIC DIAMNIOTIC TWIN PREGNANCY IN THIRD TRIMESTER: ICD-10-CM

## 2023-12-04 DIAGNOSIS — Q78.0 OSTEOGENESIS IMPERFECTA TYPE I: ICD-10-CM

## 2023-12-04 DIAGNOSIS — O09.90 AT HIGH RISK FOR COMPLICATIONS OF INTRAUTERINE PREGNANCY (IUP): ICD-10-CM

## 2023-12-04 DIAGNOSIS — F31.9 BIPOLAR DISEASE DURING PREGNANCY IN THIRD TRIMESTER: Primary | ICD-10-CM

## 2023-12-05 NOTE — TELEPHONE ENCOUNTER
----- Message from Chrystal Ruffin sent at 12/5/2023 10:19 AM CST -----  Regarding: Refill  Patient needs refill on syringes. She uses [facility] on [address]. Her call back number is [phone].      Pt still had refills from her last prescription. Called pharmacy and ordered a refill for Pt.  Called Pt to inform her that her syringes will be ready for pickup in an hour.

## 2023-12-08 ENCOUNTER — OFFICE VISIT (OUTPATIENT)
Dept: MATERNAL FETAL MEDICINE | Facility: CLINIC | Age: 18
End: 2023-12-08
Payer: MEDICAID

## 2023-12-08 ENCOUNTER — PROCEDURE VISIT (OUTPATIENT)
Dept: MATERNAL FETAL MEDICINE | Facility: CLINIC | Age: 18
End: 2023-12-08
Payer: MEDICAID

## 2023-12-08 VITALS
WEIGHT: 140 LBS | DIASTOLIC BLOOD PRESSURE: 86 MMHG | BODY MASS INDEX: 26.43 KG/M2 | HEIGHT: 61 IN | HEART RATE: 101 BPM | SYSTOLIC BLOOD PRESSURE: 138 MMHG

## 2023-12-08 DIAGNOSIS — O99.343 BIPOLAR DISEASE DURING PREGNANCY IN THIRD TRIMESTER: Primary | ICD-10-CM

## 2023-12-08 DIAGNOSIS — O24.414 GESTATIONAL DIABETES REQUIRING INSULIN: Primary | ICD-10-CM

## 2023-12-08 DIAGNOSIS — O09.90 AT HIGH RISK FOR COMPLICATIONS OF INTRAUTERINE PREGNANCY (IUP): ICD-10-CM

## 2023-12-08 DIAGNOSIS — O30.043 DICHORIONIC DIAMNIOTIC TWIN PREGNANCY IN THIRD TRIMESTER: ICD-10-CM

## 2023-12-08 DIAGNOSIS — O24.414 INSULIN CONTROLLED GESTATIONAL DIABETES MELLITUS (GDM) IN THIRD TRIMESTER: ICD-10-CM

## 2023-12-08 DIAGNOSIS — F31.9 BIPOLAR DISEASE DURING PREGNANCY IN THIRD TRIMESTER: ICD-10-CM

## 2023-12-08 DIAGNOSIS — Q78.0 OSTEOGENESIS IMPERFECTA TYPE I: ICD-10-CM

## 2023-12-08 DIAGNOSIS — O99.343 BIPOLAR DISEASE DURING PREGNANCY IN THIRD TRIMESTER: ICD-10-CM

## 2023-12-08 DIAGNOSIS — F31.9 BIPOLAR DISEASE DURING PREGNANCY IN THIRD TRIMESTER: Primary | ICD-10-CM

## 2023-12-08 PROCEDURE — 3079F PR MOST RECENT DIASTOLIC BLOOD PRESSURE 80-89 MM HG: ICD-10-PCS | Mod: CPTII,S$GLB,, | Performed by: NURSE PRACTITIONER

## 2023-12-08 PROCEDURE — 3079F DIAST BP 80-89 MM HG: CPT | Mod: CPTII,S$GLB,, | Performed by: NURSE PRACTITIONER

## 2023-12-08 PROCEDURE — 99213 PR OFFICE/OUTPT VISIT, EST, LEVL III, 20-29 MIN: ICD-10-PCS | Mod: TH,S$GLB,, | Performed by: NURSE PRACTITIONER

## 2023-12-08 PROCEDURE — 3075F PR MOST RECENT SYSTOLIC BLOOD PRESS GE 130-139MM HG: ICD-10-PCS | Mod: CPTII,S$GLB,, | Performed by: NURSE PRACTITIONER

## 2023-12-08 PROCEDURE — 3075F SYST BP GE 130 - 139MM HG: CPT | Mod: CPTII,S$GLB,, | Performed by: NURSE PRACTITIONER

## 2023-12-08 PROCEDURE — 1159F PR MEDICATION LIST DOCUMENTED IN MEDICAL RECORD: ICD-10-PCS | Mod: CPTII,S$GLB,, | Performed by: NURSE PRACTITIONER

## 2023-12-08 PROCEDURE — 99213 OFFICE O/P EST LOW 20 MIN: CPT | Mod: TH,S$GLB,, | Performed by: NURSE PRACTITIONER

## 2023-12-08 PROCEDURE — 76819 FETAL BIOPHYS PROFIL W/O NST: CPT | Mod: S$GLB,,, | Performed by: OBSTETRICS & GYNECOLOGY

## 2023-12-08 PROCEDURE — 3008F PR BODY MASS INDEX (BMI) DOCUMENTED: ICD-10-PCS | Mod: CPTII,S$GLB,, | Performed by: NURSE PRACTITIONER

## 2023-12-08 PROCEDURE — 1159F MED LIST DOCD IN RCRD: CPT | Mod: CPTII,S$GLB,, | Performed by: NURSE PRACTITIONER

## 2023-12-08 PROCEDURE — 3008F BODY MASS INDEX DOCD: CPT | Mod: CPTII,S$GLB,, | Performed by: NURSE PRACTITIONER

## 2023-12-08 PROCEDURE — 76819 FETAL BIOPHYS PROFIL W/O NST: CPT | Mod: 59,S$GLB,, | Performed by: OBSTETRICS & GYNECOLOGY

## 2023-12-08 PROCEDURE — 76819 US MFM PROCEDURE (VIEWPOINT): ICD-10-PCS | Mod: S$GLB,,, | Performed by: OBSTETRICS & GYNECOLOGY

## 2023-12-08 NOTE — PROGRESS NOTES
Maternal Fetal Medicine Follow Up Consult    Subjective     Patient ID: [id number]    Chief Complaint: high risk pregnancy followup      HPI: Rosendo Covarrubias is a 18 y.o. female  at 34w3d gestation with Estimated Date of Delivery: 24  who is here for follow  up consultation by [facility].  Patient has dichorionic diamniotic twins. She is on low dose aspirin daily. She has history of bipolar disorder. She was previously on latuda, but stopped due to nausea. She is on no medications for that at this time and she is feeling well. She follows with psychiatry service, Terrie Chambers.  Patient has history of scoliosis has been stable and no follow-up or treatment is needed.  Patient also has history of osteogenesis imperfecta type 1.  Patient reported that she had a fracture when she was young but does not remember details, reports was probably in her leg.  She has gestational diabetes and is on insulin, 6 units of Humalog with breakfast and 8 units of NPH at bedtime.        Interval history since last [facility] visit: None.. She denies any leaking fluid, vaginal bleeding, contractions, decreased fetal movement. Denies headaches, visual disturbances, or epigastric pain    Pregnancy complications include:   Patient Active Problem List   Diagnosis    Dichorionic diamniotic twin pregnancy in third trimester    At high risk for preeclampsia complications of intrauterine pregnancy (IUP)    Osteogenesis imperfecta type I    Bipolar disease during pregnancy in third trimester    Insulin controlled gestational diabetes mellitus (GDM) in third trimester        No changes to medical, surgical, family, social, or obstetric history.    Medications:  Current Outpatient Medications   Medication Instructions    aspirin (ECOTRIN) 81 mg, Oral, Daily    blood sugar diagnostic Strp Use 1 strip via device 4 times daily    blood-glucose meter kit Use device 4 times daily to check blood sugars    insulin lispro (HUMALOG U-100 INSULIN) 100 unit/mL  "injection INJECT 8 UNITS IN THE MORNING.    insulin NPH (HUMULIN N NPH U-100 INSULIN) 100 unit/mL injection INJECT 8 UNITS AT BEDTIME.    insulin syringe-needle U-100 0.5 mL 31 gauge x 5/16" Syrg USE TO SKIN TWICE DAILY AS DIRECTED.    ONETOUCH DELICA PLUS LANCET 33 gauge Misc Topical (Top), 4 times daily    PNV no.153/FA/om3/dha/epa/fish (PRENATAL GUMMIES ORAL) Oral       Review of Systems   12 point review of systems conducted, negative except as stated in the history of present illness. See HPI for details.      Objective     Visit Vitals  /86 (BP Location: Left arm, Patient Position: Sitting, BP Method: Pediatric (Automatic))   Pulse 101   Ht 5' 1" (1.549 m)   Wt 63.5 kg (140 lb)   LMP 2023 (Approximate)   BMI 26.45 kg/m²        Physical Exam  Vitals and nursing note reviewed.   Constitutional:       Appearance: Normal appearance.   HENT:      Head: Normocephalic and atraumatic.      Nose: Nose normal. No congestion.   Pulmonary:      Effort: Pulmonary effort is normal.   Skin:     Findings: No rash.   Neurological:      Mental Status: She is alert and oriented to person, place, and time.   Psychiatric:         Mood and Affect: Mood normal.         Behavior: Behavior normal.         Thought Content: Thought content normal.         Judgment: Judgment normal.           ASSESSMENT/PLAN:     18 y.o.  female with IUP at 34w3d    Dichorionic diamniotic twin pregnancy   Fetal sizes are AGA and concordant.   The EFW percentile for twin A is 28%,   AC at 35%, and the EFW is 1879 g.  On 2023  The EFW percentile for twin B is 34%,  AC at 45%,  and the EFW is 1975 g. On 2023   BPP is 8/8 on both fetuses with normal amniotic fluid volume today 2023    She is aware of risks associated with twins including higher risk of anomalies, fetal growth restriction, hypertensive disorders, gestational diabetes, anemia, cerebral palsy,  labor/delivery and postpartum hemorrhage with twin " gestation.  She is aware of the limitations of ultrasound, including checking anomalies, with need for  evaluation.    Continue low dose aspirin as discussed.    Due to increased risk factors, recommend continue serial follow-up ultrasounds every 4 weeks until end of pregnancy with twice weekly fetal testing alternating with Primary OB until delivery.     If no additional risk factors are present, then optimal time of delivery is at 38 weeks with dichorionic diamniotic twins.     Reviewed importance of FKC 3/day and prn with instructions to immediately report any decreased fetal movement.  Reviewed PTL precautions.      Osteogenesis imperfecta type I  Patient was previously advised that osteogenesis imperfecta the type 1 is autosomal dominant with 50% transmission.  With her mild disease, most likely pregnancy will be tolerated well with no additional treatment.  She is not interested in prenatal diagnosis with amniocentesis that was previously offered and declined. She is aware of the need for  evaluation.    Bipolar disease in pregnancy  Reviewed risks with depression and risks/benefits of medication use in pregnancy.  With no symptoms at this time, she will stay off of the Latuda   She was  advised to report any worsening of symptoms or SI/HI tendencies immediately to provider/ER for prompt intervention. She is to continue follow up care with her Mental Health provider.       At high risk for preeclampsia  With her increased risk for preeclampsia, she agreed to continue low-dose aspirin daily until delivery.  Preeclampsia precautions reviewed.     Gestational diabetes mellitus  Risks associated with diabetes in pregnancy include higher risk for polyhydramnios, fetal macrosomia, lower Apgar scores and  metabolic complications (hypoglycemia, hyperbilirubinemia, hypocalcemia, erythema) and developing preeclampsia.     Continue 2200 calorie diabetic diet.   Reviewed log. Patient advised to  adjust insulin to 6 units of Humalog with breakfast and 8 units of NPH at bedtime.  Continue corrective formula of 1 unit Humalog for every 8 mg over 140.      The patient was instructed to check blood sugar four times per day and to bring log each week to visits. Call sooner if abnormal values.    Continue twice weekly fetal testing until delivery, alternating with Primary OB.    The association of gestational diabetes (50%) with adult-onset type 2 diabetes mellitus was reviewed.  The importance of losing weight after the pregnancy was reviewed and emphasized, as well as doing a 75 g sugar test after postpartum exam and to have annual checkups every 1-3 years for blood sugars to make sure she does not develop diabetes.        Follow up in about 1 week (around 12/15/2023) for MFM Followup, BPP, Repeat Ultrasound, TWINS.       RICH Gayle

## 2023-12-13 DIAGNOSIS — O30.043 DICHORIONIC DIAMNIOTIC TWIN PREGNANCY IN THIRD TRIMESTER: Primary | ICD-10-CM

## 2023-12-15 ENCOUNTER — PROCEDURE VISIT (OUTPATIENT)
Dept: MATERNAL FETAL MEDICINE | Facility: CLINIC | Age: 18
End: 2023-12-15
Payer: MEDICAID

## 2023-12-15 ENCOUNTER — OFFICE VISIT (OUTPATIENT)
Dept: MATERNAL FETAL MEDICINE | Facility: CLINIC | Age: 18
End: 2023-12-15
Payer: MEDICAID

## 2023-12-15 VITALS
DIASTOLIC BLOOD PRESSURE: 62 MMHG | HEART RATE: 74 BPM | WEIGHT: 141.13 LBS | SYSTOLIC BLOOD PRESSURE: 104 MMHG | BODY MASS INDEX: 26.65 KG/M2 | HEIGHT: 61 IN

## 2023-12-15 DIAGNOSIS — O30.043 DICHORIONIC DIAMNIOTIC TWIN PREGNANCY IN THIRD TRIMESTER: ICD-10-CM

## 2023-12-15 DIAGNOSIS — F31.9 BIPOLAR DISEASE DURING PREGNANCY IN THIRD TRIMESTER: Primary | ICD-10-CM

## 2023-12-15 DIAGNOSIS — O09.90 AT HIGH RISK FOR COMPLICATIONS OF INTRAUTERINE PREGNANCY (IUP): ICD-10-CM

## 2023-12-15 DIAGNOSIS — Q78.0 OSTEOGENESIS IMPERFECTA TYPE I: ICD-10-CM

## 2023-12-15 DIAGNOSIS — B37.31 VAGINAL YEAST INFECTION: ICD-10-CM

## 2023-12-15 DIAGNOSIS — O26.899 CRAMPING AFFECTING PREGNANCY, ANTEPARTUM: ICD-10-CM

## 2023-12-15 DIAGNOSIS — O24.414 INSULIN CONTROLLED GESTATIONAL DIABETES MELLITUS (GDM) IN THIRD TRIMESTER: ICD-10-CM

## 2023-12-15 DIAGNOSIS — O99.343 BIPOLAR DISEASE DURING PREGNANCY IN THIRD TRIMESTER: Primary | ICD-10-CM

## 2023-12-15 DIAGNOSIS — R10.9 CRAMPING AFFECTING PREGNANCY, ANTEPARTUM: ICD-10-CM

## 2023-12-15 PROCEDURE — 3008F BODY MASS INDEX DOCD: CPT | Mod: CPTII,S$GLB,,

## 2023-12-15 PROCEDURE — 1159F PR MEDICATION LIST DOCUMENTED IN MEDICAL RECORD: ICD-10-PCS | Mod: CPTII,S$GLB,,

## 2023-12-15 PROCEDURE — 1160F RVW MEDS BY RX/DR IN RCRD: CPT | Mod: CPTII,S$GLB,,

## 2023-12-15 PROCEDURE — 76816 US MFM PROCEDURE (VIEWPOINT): ICD-10-PCS | Mod: S$GLB,,, | Performed by: OBSTETRICS & GYNECOLOGY

## 2023-12-15 PROCEDURE — 3078F DIAST BP <80 MM HG: CPT | Mod: CPTII,S$GLB,,

## 2023-12-15 PROCEDURE — 76819 US MFM PROCEDURE (VIEWPOINT): ICD-10-PCS | Mod: S$GLB,,, | Performed by: OBSTETRICS & GYNECOLOGY

## 2023-12-15 PROCEDURE — 76819 FETAL BIOPHYS PROFIL W/O NST: CPT | Mod: S$GLB,,, | Performed by: OBSTETRICS & GYNECOLOGY

## 2023-12-15 PROCEDURE — 1159F MED LIST DOCD IN RCRD: CPT | Mod: CPTII,S$GLB,,

## 2023-12-15 PROCEDURE — 76816 OB US FOLLOW-UP PER FETUS: CPT | Mod: S$GLB,,, | Performed by: OBSTETRICS & GYNECOLOGY

## 2023-12-15 PROCEDURE — 99214 OFFICE O/P EST MOD 30 MIN: CPT | Mod: TH,S$GLB,,

## 2023-12-15 PROCEDURE — 3074F SYST BP LT 130 MM HG: CPT | Mod: CPTII,S$GLB,,

## 2023-12-15 PROCEDURE — 1160F PR REVIEW ALL MEDS BY PRESCRIBER/CLIN PHARMACIST DOCUMENTED: ICD-10-PCS | Mod: CPTII,S$GLB,,

## 2023-12-15 PROCEDURE — 76819 FETAL BIOPHYS PROFIL W/O NST: CPT | Mod: 59,S$GLB,, | Performed by: OBSTETRICS & GYNECOLOGY

## 2023-12-15 PROCEDURE — 3074F PR MOST RECENT SYSTOLIC BLOOD PRESSURE < 130 MM HG: ICD-10-PCS | Mod: CPTII,S$GLB,,

## 2023-12-15 PROCEDURE — 3008F PR BODY MASS INDEX (BMI) DOCUMENTED: ICD-10-PCS | Mod: CPTII,S$GLB,,

## 2023-12-15 PROCEDURE — 99214 PR OFFICE/OUTPT VISIT, EST, LEVL IV, 30-39 MIN: ICD-10-PCS | Mod: TH,S$GLB,,

## 2023-12-15 PROCEDURE — 3078F PR MOST RECENT DIASTOLIC BLOOD PRESSURE < 80 MM HG: ICD-10-PCS | Mod: CPTII,S$GLB,,

## 2023-12-15 PROCEDURE — 76816 OB US FOLLOW-UP PER FETUS: CPT | Mod: 59,S$GLB,, | Performed by: OBSTETRICS & GYNECOLOGY

## 2023-12-15 NOTE — PROGRESS NOTES
Maternal Fetal Medicine Follow Up Consult    Subjective     Patient ID: [id number]    Chief Complaint: high risk pregnancy followup      HPI: Rosendo Covarrubias is a 18 y.o. female  at 35w3d gestation with Estimated Date of Delivery: 24  who is here for follow  up consultation by [facility].  Patient has dichorionic diamniotic twins. She is on low dose aspirin daily. She has history of bipolar disorder. She was previously on latuda, but stopped due to nausea. She is on no medications for that at this time and she is feeling well. She follows with psychiatry service, Terrie Chambers.  Patient has history of scoliosis has been stable and no follow-up or treatment is needed.  Patient also has history of osteogenesis imperfecta type 1.  Patient reported that she had a fracture when she was young but does not remember details, reports was probably in her leg.  She has gestational diabetes and is on insulin, 6 units of Humalog with breakfast and 8 units of NPH at bedtime.  She also has corrective formula of 1 unit of Humalog for every 8 mg over 140.     She reports she went to the hospital last night with cramps and was diagnosed with a yeast infection and prescribed Diflucan.  She had no contractions noted on toco, some irritability.  No cervical change.  She reports has not picked the Diflucan up yet because she is not having any symptoms. She reports resolution of cramps.  She denies any leaking of fluid, vaginal bleeding, or decreased fetal movement.       Interval history since last [facility] visit: None.. She denies any leaking fluid, vaginal bleeding, contractions, decreased fetal movement. Denies headaches, visual disturbances, or epigastric pain    Pregnancy complications include:   Patient Active Problem List   Diagnosis    Dichorionic diamniotic twin pregnancy in third trimester    At high risk for preeclampsia complications of intrauterine pregnancy (IUP)    Osteogenesis imperfecta type I    Bipolar disease during pregnancy  "in third trimester    Insulin controlled gestational diabetes mellitus (GDM) in third trimester    Cramping affecting pregnancy, antepartum (RESOLVED)    Vaginal yeast infection (reported)        No changes to medical, surgical, family, social, or obstetric history.    Medications:  Current Outpatient Medications   Medication Instructions    aspirin (ECOTRIN) 81 mg, Oral, Daily    blood sugar diagnostic Strp Use 1 strip via device 4 times daily    blood-glucose meter kit Use device 4 times daily to check blood sugars    dextromethorphan-guaiFENesin  mg (MUCINEX DM)  mg per 12 hr tablet 1 tablet, Oral, Every 12 hours (non-standard times)    insulin lispro (HUMALOG U-100 INSULIN) 100 unit/mL injection INJECT 8 UNITS IN THE MORNING.    insulin NPH (HUMULIN N NPH U-100 INSULIN) 100 unit/mL injection INJECT 8 UNITS AT BEDTIME.    insulin syringe-needle U-100 0.5 mL 31 gauge x 5/16" Syrg USE TO SKIN TWICE DAILY AS DIRECTED.    ONETOUCH DELICA PLUS LANCET 33 gauge Misc Topical (Top), 4 times daily    PNV no.153/FA/om3/dha/epa/fish (PRENATAL GUMMIES ORAL) Oral       Review of Systems   12 point review of systems conducted, negative except as stated in the history of present illness. See HPI for details.      Objective     Visit Vitals  /62 (BP Location: Left arm, Patient Position: Sitting, BP Method: Large (Automatic))   Pulse 74   Ht 5' 1" (1.549 m)   Wt 64 kg (141 lb 1.6 oz)   LMP 03/30/2023 (Approximate)   BMI 26.66 kg/m²        Physical Exam  Vitals and nursing note reviewed.   Constitutional:       Appearance: Normal appearance.   HENT:      Head: Normocephalic and atraumatic.      Nose: Nose normal. No congestion.   Pulmonary:      Effort: Pulmonary effort is normal.   Skin:     Findings: No rash.   Neurological:      Mental Status: She is alert and oriented to person, place, and time.   Psychiatric:         Mood and Affect: Mood normal.         Behavior: Behavior normal.         Thought Content: " Thought content normal.         Judgment: Judgment normal.           ASSESSMENT/PLAN:     18 y.o.  female with IUP at 35w3d    Dichorionic diamniotic twin pregnancy   A = Fetal size is AGA with the EFW 2468g at the 20%.  AC is at the 33% on 12/15/2023.  BPP is reassuring at . MVP is normal.    B = Fetal size is AGA with the EFW 2800g at the 43%. AC is at the 61% on 12/15/2023.  BPP is reassuring at . MVP is normal.      Growth discordance is 11.9 %.     She is aware of risks associated with twins including higher risk of anomalies, fetal growth restriction, hypertensive disorders, gestational diabetes, anemia, cerebral palsy,  labor/delivery and postpartum hemorrhage with twin gestation.  She is aware of the limitations of ultrasound, including checking anomalies, with need for  evaluation.    Continue low dose aspirin as discussed.    Due to increased risk factors, recommend continue twice weekly fetal testing alternating with Primary OB until delivery.     If no additional risk factors are present, then optimal time of delivery is at 38 weeks with dichorionic diamniotic twins.     Reviewed importance of FKC 3/day and prn with instructions to immediately report any decreased fetal movement.  Reviewed PTL precautions.      Osteogenesis imperfecta type I  Patient was previously advised that osteogenesis imperfecta the type 1 is autosomal dominant with 50% transmission.  With her mild disease, most likely pregnancy will be tolerated well with no additional treatment.  She is not interested in prenatal diagnosis with amniocentesis that was previously offered and declined. She is aware of the need for  evaluation.      Bipolar disease in pregnancy  Reviewed risks with depression and risks/benefits of medication use in pregnancy.  With no symptoms at this time, she will stay off of the Latuda   She was  advised to report any worsening of symptoms or SI/HI tendencies immediately to  provider/ER for prompt intervention. She is to continue follow up care with her Mental Health provider.       At high risk for preeclampsia  With her increased risk for preeclampsia, she agreed to continue low-dose aspirin daily until delivery.  Preeclampsia precautions reviewed.       Gestational diabetes mellitus  Risks associated with diabetes in pregnancy include higher risk for polyhydramnios, fetal macrosomia, lower Apgar scores and  metabolic complications (hypoglycemia, hyperbilirubinemia, hypocalcemia, erythema) and developing preeclampsia.     Continue 2200 calorie diabetic diet.     Reviewed log.  There were some low values after breakfast.  Advised her to discontinue the breakfast Humalog at this time.  Patient advised to continue 8 units of NPH at bedtime.  Continue corrective formula of 1 unit Humalog for every 8 mg over 140.      The patient was instructed to check blood sugar four times per day and to bring log each week to visits. Call sooner if abnormal values.    Continue twice weekly fetal testing until delivery, alternating with Primary OB.    The association of gestational diabetes (50%) with adult-onset type 2 diabetes mellitus was reviewed.  The importance of losing weight after the pregnancy was reviewed and emphasized, as well as doing a 75 g sugar test after postpartum exam and to have annual checkups every 1-3 years for blood sugars to make sure she does not develop diabetes.        Hospital visit on 23 with cramps  Patient reports resolution of cramps.  She denies any vaginal bleeding, leaking fluid, decreased fetal movement.  Advised her to go back to the hospital with any of the above occur.  PTL precautions given.      Reported diagnosis of yeast infection without symptoms  She has not picked up the Diflucan yet.  With patient reporting no vaginal itching and discharge, advised her that it would be best not to take the Diflucan unless she is symptomatic.  If she becomes  symptomatic advised her to try over-the-counter antifungals 1st, like Monistat.      Follow up in about 1 week (around 12/22/2023) for MFM follow-up, BPP, Twins.     Future Appointments   Date Time Provider Department Center   12/22/2023  9:00 AM Enrrique Hardy MD [facility]   12/22/2023  9:00 AM ROOM 3, [facility] [facility] Gracia Benson PA-C    This note was created with the assistance of [employer] voice recognition software. There may be transcription errors as a result of using this technology however minimal. Effort has been made to assure accuracy of transcription but any obvious errors or omissions should be clarified with the author of the document.

## 2023-12-18 ENCOUNTER — TELEPHONE (OUTPATIENT)
Dept: MATERNAL FETAL MEDICINE | Facility: CLINIC | Age: 18
End: 2023-12-18
Payer: MEDICAID

## 2023-12-18 DIAGNOSIS — O24.419 GESTATIONAL DIABETES MELLITUS (GDM) IN SECOND TRIMESTER, GESTATIONAL DIABETES METHOD OF CONTROL UNSPECIFIED: ICD-10-CM

## 2023-12-18 RX ORDER — INSULIN LISPRO 100 [IU]/ML
INJECTION, SOLUTION INTRAVENOUS; SUBCUTANEOUS
Qty: 10 ML | Refills: 2 | Status: SHIPPED | OUTPATIENT
Start: 2023-12-18

## 2023-12-19 DIAGNOSIS — O24.419 GESTATIONAL DIABETES MELLITUS (GDM) IN SECOND TRIMESTER, GESTATIONAL DIABETES METHOD OF CONTROL UNSPECIFIED: Primary | ICD-10-CM

## 2023-12-22 ENCOUNTER — PROCEDURE VISIT (OUTPATIENT)
Dept: MATERNAL FETAL MEDICINE | Facility: CLINIC | Age: 18
End: 2023-12-22
Payer: MEDICAID

## 2023-12-22 ENCOUNTER — OFFICE VISIT (OUTPATIENT)
Dept: MATERNAL FETAL MEDICINE | Facility: CLINIC | Age: 18
End: 2023-12-22
Payer: MEDICAID

## 2023-12-22 VITALS
HEIGHT: 61 IN | BODY MASS INDEX: 26.81 KG/M2 | SYSTOLIC BLOOD PRESSURE: 135 MMHG | HEART RATE: 74 BPM | WEIGHT: 142 LBS | DIASTOLIC BLOOD PRESSURE: 69 MMHG

## 2023-12-22 DIAGNOSIS — O24.414 INSULIN CONTROLLED GESTATIONAL DIABETES MELLITUS (GDM) IN THIRD TRIMESTER: ICD-10-CM

## 2023-12-22 DIAGNOSIS — O24.419 GESTATIONAL DIABETES MELLITUS (GDM) IN SECOND TRIMESTER, GESTATIONAL DIABETES METHOD OF CONTROL UNSPECIFIED: ICD-10-CM

## 2023-12-22 DIAGNOSIS — O99.013 ANEMIA AFFECTING PREGNANCY IN THIRD TRIMESTER: Primary | ICD-10-CM

## 2023-12-22 DIAGNOSIS — F31.9 BIPOLAR DISEASE DURING PREGNANCY IN THIRD TRIMESTER: Primary | ICD-10-CM

## 2023-12-22 DIAGNOSIS — Q78.0 OSTEOGENESIS IMPERFECTA TYPE I: ICD-10-CM

## 2023-12-22 DIAGNOSIS — O16.3 ELEVATED BLOOD PRESSURE AFFECTING PREGNANCY IN THIRD TRIMESTER, ANTEPARTUM: ICD-10-CM

## 2023-12-22 DIAGNOSIS — O09.90 AT HIGH RISK FOR COMPLICATIONS OF INTRAUTERINE PREGNANCY (IUP): ICD-10-CM

## 2023-12-22 DIAGNOSIS — O99.343 BIPOLAR DISEASE DURING PREGNANCY IN THIRD TRIMESTER: Primary | ICD-10-CM

## 2023-12-22 DIAGNOSIS — O30.043 DICHORIONIC DIAMNIOTIC TWIN PREGNANCY IN THIRD TRIMESTER: ICD-10-CM

## 2023-12-22 PROBLEM — B37.31 VAGINAL YEAST INFECTION: Status: RESOLVED | Noted: 2023-12-15 | Resolved: 2023-12-22

## 2023-12-22 PROBLEM — O26.899 CRAMPING AFFECTING PREGNANCY, ANTEPARTUM: Status: RESOLVED | Noted: 2023-12-15 | Resolved: 2023-12-22

## 2023-12-22 PROBLEM — R10.9 CRAMPING AFFECTING PREGNANCY, ANTEPARTUM: Status: RESOLVED | Noted: 2023-12-15 | Resolved: 2023-12-22

## 2023-12-22 PROCEDURE — 76819 PR US, OB, FETAL BIOPHYSICAL, W/O NST: ICD-10-PCS | Mod: S$GLB,,, | Performed by: OBSTETRICS & GYNECOLOGY

## 2023-12-22 PROCEDURE — 3078F PR MOST RECENT DIASTOLIC BLOOD PRESSURE < 80 MM HG: ICD-10-PCS | Mod: CPTII,S$GLB,, | Performed by: OBSTETRICS & GYNECOLOGY

## 2023-12-22 PROCEDURE — 3075F SYST BP GE 130 - 139MM HG: CPT | Mod: CPTII,S$GLB,, | Performed by: OBSTETRICS & GYNECOLOGY

## 2023-12-22 PROCEDURE — 99214 OFFICE O/P EST MOD 30 MIN: CPT | Mod: TH,S$GLB,, | Performed by: OBSTETRICS & GYNECOLOGY

## 2023-12-22 PROCEDURE — 1159F MED LIST DOCD IN RCRD: CPT | Mod: CPTII,S$GLB,, | Performed by: OBSTETRICS & GYNECOLOGY

## 2023-12-22 PROCEDURE — 3075F PR MOST RECENT SYSTOLIC BLOOD PRESS GE 130-139MM HG: ICD-10-PCS | Mod: CPTII,S$GLB,, | Performed by: OBSTETRICS & GYNECOLOGY

## 2023-12-22 PROCEDURE — 99214 PR OFFICE/OUTPT VISIT, EST, LEVL IV, 30-39 MIN: ICD-10-PCS | Mod: TH,S$GLB,, | Performed by: OBSTETRICS & GYNECOLOGY

## 2023-12-22 PROCEDURE — 3008F PR BODY MASS INDEX (BMI) DOCUMENTED: ICD-10-PCS | Mod: CPTII,S$GLB,, | Performed by: OBSTETRICS & GYNECOLOGY

## 2023-12-22 PROCEDURE — 3078F DIAST BP <80 MM HG: CPT | Mod: CPTII,S$GLB,, | Performed by: OBSTETRICS & GYNECOLOGY

## 2023-12-22 PROCEDURE — 76819 FETAL BIOPHYS PROFIL W/O NST: CPT | Mod: 59,S$GLB,, | Performed by: OBSTETRICS & GYNECOLOGY

## 2023-12-22 PROCEDURE — 1159F PR MEDICATION LIST DOCUMENTED IN MEDICAL RECORD: ICD-10-PCS | Mod: CPTII,S$GLB,, | Performed by: OBSTETRICS & GYNECOLOGY

## 2023-12-22 PROCEDURE — 3008F BODY MASS INDEX DOCD: CPT | Mod: CPTII,S$GLB,, | Performed by: OBSTETRICS & GYNECOLOGY

## 2023-12-22 RX ORDER — FERROUS SULFATE 325(65) MG
325 TABLET ORAL 2 TIMES DAILY
Qty: 60 TABLET | Refills: 3 | Status: SHIPPED | OUTPATIENT
Start: 2023-12-22

## 2023-12-22 RX ORDER — FOLIC ACID 1 MG/1
1 TABLET ORAL DAILY
Qty: 30 TABLET | Refills: 3 | Status: SHIPPED | OUTPATIENT
Start: 2023-12-22

## 2023-12-22 RX ORDER — INSULIN HUMAN 100 [IU]/ML
INJECTION, SUSPENSION SUBCUTANEOUS
Qty: 10 ML | Refills: 2 | Status: SHIPPED | OUTPATIENT
Start: 2023-12-22 | End: 2024-12-21

## 2023-12-22 RX ORDER — BLACK COHOSH ROOT 200 MG
250 CAPSULE ORAL 2 TIMES DAILY
Qty: 60 EACH | Refills: 3 | Status: SHIPPED | OUTPATIENT
Start: 2023-12-22 | End: 2024-01-21

## 2023-12-22 NOTE — PROGRESS NOTES
AST and ALT elevated at 70 and 80, respectively. Uric acid elevated at 7.6. Remainder of labs normal with creatinine 0.73, platelets 197, . Discussed with Dr. Hardy, who discussed with Dr. Moreno. We will plan to have patient go to the hospital for further evaluation. If another blood pressure elevation is noted, would confirmed diagnosis of severe preeclampsia and recommend proceeding with delivery at that time.  If blood pressure is normal will plan to monitor and repeat labs in the morning to determine next steps.    Called the patient and instructed her to go to the hospital at this time for evaluation.  She is going to [facility] at this time.  Her questions were answered.

## 2023-12-22 NOTE — PROGRESS NOTES
Maternal Fetal Medicine Follow Up    Subjective     Patient ID: [id number]    Chief Complaint: M follow up with US (Bipolar disease and GDM.)      HPI: Rosendo Covarrubias is a 18 y.o. female  at 36w3d gestation with Estimated Date of Delivery: 24  who is here for follow  up consultation by M.    Patient has dichorionic diamniotic twins. She is on low dose aspirin daily. She has history of bipolar disorder. She was previously on latuda, but stopped due to nausea. She is on no medications for that at this time and she is feeling well. She follows with psychiatry service, Terrie Chambers.  Patient has history of scoliosis has been stable and no follow-up or treatment is needed.  Patient also has history of osteogenesis imperfecta type 1.  Patient reported that she had a fracture when she was young but does not remember details, reports was probably in her leg.  She has gestational diabetes and is on insulin, 8 units of NPH at bedtime.  She also has corrective formula of 1 unit of Humalog for every 8 mg over 140.        Interval history since last M visit: None.. She denies any leaking fluid, vaginal bleeding, contractions, decreased fetal movement. Denies headaches, visual disturbances, or epigastric pain.    Pregnancy complications include:   Patient Active Problem List   Diagnosis    Dichorionic diamniotic twin pregnancy in third trimester    At high risk for preeclampsia complications of intrauterine pregnancy (IUP)    Osteogenesis imperfecta type I    Bipolar disease during pregnancy in third trimester    Insulin controlled gestational diabetes mellitus (GDM) in third trimester    Elevated blood pressure transient/labile affecting pregnancy in third trimester, antepartum        No changes to medical, surgical, family, social, or obstetric history.    Medications:  Current Outpatient Medications   Medication Instructions    aspirin (ECOTRIN) 81 mg, Oral, Daily    blood sugar diagnostic Strp Use 1 strip via  "device 4 times daily    blood-glucose meter kit Use device 4 times daily to check blood sugars    dextromethorphan-guaiFENesin  mg (MUCINEX DM)  mg per 12 hr tablet 1 tablet, Oral, Every 12 hours (non-standard times)    insulin lispro (HUMALOG U-100 INSULIN) 100 unit/mL injection INJECT 1 UNIT FOR EVERY 8 MG OVER 140, MAX DOSE PER DAY 50 UNITS    insulin NPH (HUMULIN N NPH U-100 INSULIN) 100 unit/mL injection INJECT 8 UNITS AT BEDTIME.    insulin syringe-needle U-100 0.5 mL 31 gauge x 5/16" Syrg USE TO SKIN TWICE DAILY AS DIRECTED.    ONETOUCH DELICA PLUS LANCET 33 gauge Misc Topical (Top), 4 times daily    PNV no.153/FA/om3/dha/epa/fish (PRENATAL GUMMIES ORAL) Oral    prenatal vit/iron fum/folic ac (PRENATAL 1+1 ORAL) Oral       Review of Systems   12 point review of systems conducted, negative except as stated in the history of present illness. See HPI for details.      Objective     Visit Vitals  /69 (BP Location: Right arm, Patient Position: Sitting, BP Method: Pediatric (Automatic))   Pulse 74   Ht 5' 1" (1.549 m)   Wt 64.4 kg (142 lb)   LMP 2023 (Approximate)   BMI 26.83 kg/m²        Physical Exam  Vitals and nursing note reviewed.   Constitutional:       Appearance: Normal appearance.   HENT:      Head: Normocephalic and atraumatic.      Nose: Nose normal. No congestion.   Cardiovascular:      Rate and Rhythm: Normal rate.   Pulmonary:      Effort: Pulmonary effort is normal.   Skin:     Findings: No rash.   Neurological:      Mental Status: She is alert and oriented to person, place, and time.   Psychiatric:         Mood and Affect: Mood normal.         Behavior: Behavior normal.         Thought Content: Thought content normal.         Judgment: Judgment normal.           ASSESSMENT/PLAN:     18 y.o.  female with IUP at 36w3d    Dichorionic diamniotic twin pregnancy   A = Fetal size is AGA with the EFW 2468g at the 20%.  AC is at the 33% on 12/15/2023.  BPP is reassuring at 8/8. " MVP is normal.    B = Fetal size is AGA with the EFW 2800g at the 43%. AC is at the 61% on 12/15/2023.  BPP is reassuring at 88. MVP is normal.      Growth discordance is 11.9 %.     She is aware of risks associated with twins including higher risk of anomalies, fetal growth restriction, hypertensive disorders, gestational diabetes, anemia, cerebral palsy,  labor/delivery and postpartum hemorrhage with twin gestation.  She is aware of the limitations of ultrasound, including checking anomalies, with need for  evaluation.    Continue low dose aspirin as discussed.    Due to increased risk factors, recommend continue twice weekly fetal testing alternating with Primary OB until delivery.     If no additional risk factors are present, then optimal time of delivery is at 38 weeks with dichorionic diamniotic twins.     Reviewed importance of FKC 3/day and prn with instructions to immediately report any decreased fetal movement.  Reviewed PTL precautions.      Osteogenesis imperfecta type I  Patient was previously advised that osteogenesis imperfecta the type 1 is autosomal dominant with 50% transmission.  With her mild disease, most likely pregnancy will be tolerated well with no additional treatment.  She is not interested in prenatal diagnosis with amniocentesis that was previously offered and declined. She is aware of the need for  evaluation.      Bipolar disease in pregnancy  Reviewed risks with depression and risks/benefits of medication use in pregnancy.    With no symptoms at this time, she will stay off of the Latuda   She was  advised to report any worsening of symptoms or SI/HI tendencies immediately to provider/ER for prompt intervention. She is to continue follow up care with her Mental Health provider.       At high risk for preeclampsia  With her increased risk for preeclampsia, she agreed to continue low-dose aspirin daily until delivery.  Preeclampsia precautions reviewed.        Gestational diabetes mellitus  Risks associated with diabetes in pregnancy include higher risk for polyhydramnios, fetal macrosomia, lower Apgar scores and  metabolic complications (hypoglycemia, hyperbilirubinemia, hypocalcemia, erythema) and developing preeclampsia.     Continue 2200 calorie diabetic diet.     Reviewed log.  Patient advised to continue 8 units of NPH at bedtime.  Continue corrective formula of 1 unit Humalog for every 8 mg over 140.      With all blood sugars being normal in the same range, advised her check her sugars 2 times a day alternating fasting and postprandial blood sugars.  She is to bring log each week to visits. Call sooner if abnormal values.    Continue twice weekly fetal testing until delivery, alternating with Primary OB.    The association of gestational diabetes (50%) with adult-onset type 2 diabetes mellitus was reviewed.  The importance of losing weight after the pregnancy was reviewed and emphasized, as well as doing a 75 g sugar test after postpartum exam and to have annual checkups every 1-3 years for blood sugars to make sure she does not develop diabetes.      Labile blood pressure with the initial reading of 143/82 with a repeat reading of 135/69.  Patient is in the setting for preeclampsia with twin gestation and primigravida.  Because of that,  despite the absence of symptoms, elected to do preeclampsia labs.  Preeclampsia warnings were given.  We will check the labs and follow.  If confirmation of gestational hypertension diagnosis made that is mild then plan delivery at 37 weeks' gestation.  Immediate delivery will be done for any severe features or other obstetrical indications if needed.      Follow up in about 1 week (around 2023) for BPP, MFM follow-up, Twins.     No future appointments.       CAYETANO involvement: Patient was evaluated and examined by Dr. Hardy. KARISSA Velasco, helped in pre charting of part of note.    Components of this note  were documented using voice recognition systems and are subject to errors not corrected at proofreading. Please contact the author for any clarifications.

## 2023-12-22 NOTE — TELEPHONE ENCOUNTER
----- Message from Elza Benson PA-C sent at 12/22/2023 11:43 AM CST -----  Please notify patient of below:    1. We only got the CBC back so far, platelets were normal.  We will wait on the other preeclampsia labs.  Did notice that she is anemic.  We will start her on oral hematinic therapy and order iron studies.  If her iron is low we may plan for her to get IV iron.  Please fax the order to whatever lab she wants to go to    Called pt and notified her of CBC lab results and also put in refills on her insulin as requested   
home

## 2023-12-22 NOTE — PROGRESS NOTES
Please notify patient of below:    1. We only got the CBC back so far, platelets were normal.  We will wait on the other preeclampsia labs.  Did notice that she is anemic.  We will start her on oral hematinic therapy and order iron studies.  If her iron is low we may plan for her to get IV iron.  Please fax the order to whatever lab she wants to go to

## 2023-12-27 DIAGNOSIS — O24.419 GESTATIONAL DIABETES MELLITUS (GDM) IN SECOND TRIMESTER, GESTATIONAL DIABETES METHOD OF CONTROL UNSPECIFIED: Primary | ICD-10-CM

## 2024-01-22 ENCOUNTER — TELEPHONE (OUTPATIENT)
Dept: MATERNAL FETAL MEDICINE | Facility: CLINIC | Age: 19
End: 2024-01-22
Payer: MEDICAID